# Patient Record
Sex: FEMALE | Race: WHITE | Employment: FULL TIME | URBAN - NONMETROPOLITAN AREA
[De-identification: names, ages, dates, MRNs, and addresses within clinical notes are randomized per-mention and may not be internally consistent; named-entity substitution may affect disease eponyms.]

---

## 2017-10-16 ENCOUNTER — OFFICE VISIT (OUTPATIENT)
Dept: FAMILY MEDICINE CLINIC | Age: 42
End: 2017-10-16
Payer: COMMERCIAL

## 2017-10-16 ENCOUNTER — HOSPITAL ENCOUNTER (OUTPATIENT)
Dept: LAB | Age: 42
Setting detail: SPECIMEN
Discharge: HOME | End: 2017-10-16
Payer: COMMERCIAL

## 2017-10-16 VITALS
SYSTOLIC BLOOD PRESSURE: 102 MMHG | HEART RATE: 63 BPM | BODY MASS INDEX: 26.2 KG/M2 | HEIGHT: 66 IN | OXYGEN SATURATION: 99 % | DIASTOLIC BLOOD PRESSURE: 70 MMHG | WEIGHT: 163 LBS | TEMPERATURE: 99.1 F

## 2017-10-16 DIAGNOSIS — Z13.220 SCREENING CHOLESTEROL LEVEL: ICD-10-CM

## 2017-10-16 DIAGNOSIS — S01.319A: ICD-10-CM

## 2017-10-16 DIAGNOSIS — N64.4 BREAST PAIN: Primary | ICD-10-CM

## 2017-10-16 DIAGNOSIS — Z13.1 SCREENING FOR DIABETES MELLITUS: ICD-10-CM

## 2017-10-16 DIAGNOSIS — F33.0 MILD EPISODE OF RECURRENT MAJOR DEPRESSIVE DISORDER (HCC): ICD-10-CM

## 2017-10-16 DIAGNOSIS — Z13.31 POSITIVE DEPRESSION SCREENING: ICD-10-CM

## 2017-10-16 LAB
ANION GAP SERPL CALCULATED.3IONS-SCNC: 13 MMOL/L (ref 9–17)
BUN BLDV-MCNC: 15 MG/DL (ref 6–20)
BUN/CREAT BLD: 21 (ref 9–20)
CALCIUM SERPL-MCNC: 9.2 MG/DL (ref 8.6–10.4)
CHLORIDE BLD-SCNC: 100 MMOL/L (ref 98–107)
CHOLESTEROL/HDL RATIO: 3.4
CHOLESTEROL: 192 MG/DL
CO2: 26 MMOL/L (ref 20–31)
CREAT SERPL-MCNC: 0.72 MG/DL (ref 0.5–0.9)
ESTIMATED AVERAGE GLUCOSE: 111 MG/DL
GFR AFRICAN AMERICAN: >60 ML/MIN
GFR NON-AFRICAN AMERICAN: >60 ML/MIN
GFR SERPL CREATININE-BSD FRML MDRD: ABNORMAL ML/MIN/{1.73_M2}
GFR SERPL CREATININE-BSD FRML MDRD: ABNORMAL ML/MIN/{1.73_M2}
GLUCOSE BLD-MCNC: 94 MG/DL (ref 70–99)
HBA1C MFR BLD: 5.5 % (ref 4.8–5.9)
HDLC SERPL-MCNC: 56 MG/DL
LDL CHOLESTEROL: 117 MG/DL (ref 0–130)
POTASSIUM SERPL-SCNC: 4.2 MMOL/L (ref 3.7–5.3)
SODIUM BLD-SCNC: 139 MMOL/L (ref 135–144)
TRIGL SERPL-MCNC: 94 MG/DL
VLDLC SERPL CALC-MCNC: NORMAL MG/DL (ref 1–30)

## 2017-10-16 PROCEDURE — G8419 CALC BMI OUT NRM PARAM NOF/U: HCPCS | Performed by: FAMILY MEDICINE

## 2017-10-16 PROCEDURE — G8427 DOCREV CUR MEDS BY ELIG CLIN: HCPCS | Performed by: FAMILY MEDICINE

## 2017-10-16 PROCEDURE — G8484 FLU IMMUNIZE NO ADMIN: HCPCS | Performed by: FAMILY MEDICINE

## 2017-10-16 PROCEDURE — G8431 POS CLIN DEPRES SCRN F/U DOC: HCPCS | Performed by: FAMILY MEDICINE

## 2017-10-16 PROCEDURE — 1036F TOBACCO NON-USER: CPT | Performed by: FAMILY MEDICINE

## 2017-10-16 PROCEDURE — 80061 LIPID PANEL: CPT

## 2017-10-16 PROCEDURE — 36415 COLL VENOUS BLD VENIPUNCTURE: CPT

## 2017-10-16 PROCEDURE — 83036 HEMOGLOBIN GLYCOSYLATED A1C: CPT

## 2017-10-16 PROCEDURE — 80048 BASIC METABOLIC PNL TOTAL CA: CPT

## 2017-10-16 PROCEDURE — 99214 OFFICE O/P EST MOD 30 MIN: CPT | Performed by: FAMILY MEDICINE

## 2017-10-16 ASSESSMENT — PATIENT HEALTH QUESTIONNAIRE - PHQ9
SUM OF ALL RESPONSES TO PHQ QUESTIONS 1-9: 12
SUM OF ALL RESPONSES TO PHQ9 QUESTIONS 1 & 2: 4
9. THOUGHTS THAT YOU WOULD BE BETTER OFF DEAD, OR OF HURTING YOURSELF: 0
10. IF YOU CHECKED OFF ANY PROBLEMS, HOW DIFFICULT HAVE THESE PROBLEMS MADE IT FOR YOU TO DO YOUR WORK, TAKE CARE OF THINGS AT HOME, OR GET ALONG WITH OTHER PEOPLE: 1
5. POOR APPETITE OR OVEREATING: 3
2. FEELING DOWN, DEPRESSED OR HOPELESS: 2
4. FEELING TIRED OR HAVING LITTLE ENERGY: 2
7. TROUBLE CONCENTRATING ON THINGS, SUCH AS READING THE NEWSPAPER OR WATCHING TELEVISION: 0
8. MOVING OR SPEAKING SO SLOWLY THAT OTHER PEOPLE COULD HAVE NOTICED. OR THE OPPOSITE, BEING SO FIGETY OR RESTLESS THAT YOU HAVE BEEN MOVING AROUND A LOT MORE THAN USUAL: 0
6. FEELING BAD ABOUT YOURSELF - OR THAT YOU ARE A FAILURE OR HAVE LET YOURSELF OR YOUR FAMILY DOWN: 1
3. TROUBLE FALLING OR STAYING ASLEEP: 2
1. LITTLE INTEREST OR PLEASURE IN DOING THINGS: 2

## 2017-10-16 ASSESSMENT — ENCOUNTER SYMPTOMS
DIARRHEA: 0
SINUS PRESSURE: 0
CHEST TIGHTNESS: 0
SHORTNESS OF BREATH: 0
BACK PAIN: 1
CONSTIPATION: 1
COUGH: 0
ABDOMINAL PAIN: 0
WHEEZING: 0

## 2017-10-16 NOTE — PROGRESS NOTES
[address] [facility]  Dept: [phone]  Dept Fax: [phone]  Loc: [phone]    Fernando Garcia is a 38 y.o. female who presents today for her medical conditions/complaints as noted below. Fernando Garcia is c/o of   Chief Complaint   Patient presents with    Established New Doctor    Breast Pain     bilateral- right side is more tender x 3 weeks, pt denies any discharge and the pain is intermittant        HPI:     HPI Here today to establish care. She has not seen a pcp in a long time. She has been having issues with breast pain and depression. She has a lot of stomach problems and it was thought that this could be due to too much coffee. She has been trying to drink more water. Breast pain: She has been having problems with pain on the right breast more than the left but the left is affected too. The pain is on the lateral side of the breast. No nipple discharge. No skin changes or redness. With her period this time her breasts became swollen and she was also having hot flashes. LMP was 10/9/17. Her periods are normally very regular. The pain feels more like a soreness. The pain comes and goes. Depression: she feels like she struggles since her divorce. She sees a counselor at [facility]. She is doing some things for fun. She struggles to do fun activities when she is by herself. Her kids are 15 (girl), 6 (girl), 4 (boy). She eats normally. She sleeps pretty well. She used to struggle with panic attacks,but has not had one for a long time. Past Medical History:   Diagnosis Date    Allergic rhinitis     Anemia     struggled with anemia in high school d/t heavy menstration    Syncope     passed out several times in HS          Social History   Substance Use Topics    Smoking status: Never Smoker    Smokeless tobacco: Never Used    Alcohol use No      No current outpatient prescriptions on file.      No current no tenderness. Breasts are symmetrical.   Abdominal: Soft. Bowel sounds are normal. She exhibits no distension. There is no tenderness. There is no guarding. Musculoskeletal: Normal range of motion. She exhibits no edema. Lymphadenopathy:     She has no cervical adenopathy. Neurological: She is alert and oriented to person, place, and time. Skin: Skin is warm and dry. No rash noted. Psychiatric: She has a normal mood and affect. Her behavior is normal. Judgment normal.   Nursing note and vitals reviewed. /70   Pulse 63   Temp 99.1 °F (37.3 °C) (Tympanic)   Ht 5' 6\" (1.676 m)   Wt 163 lb (73.9 kg)   LMP 10/09/2017 (Approximate)   SpO2 99%   Breastfeeding? No   BMI 26.31 kg/m²     Assessment:      1. Breast pain  ALEXIS DIGITAL DIAGNOSTIC BILATERAL    US Breast Bilateral   2. Mild episode of recurrent major depressive disorder ([facility] [address]     3. Tear of earlobe, initial encounter  Ting Verma MD, Dermatology [place]   4. Screening for diabetes mellitus  Basic Metabolic Panel    Hemoglobin A1C   5. Screening cholesterol level  Lipid Panel             Plan:       Breast pain: new; I recommended an ultrasound and mammogram to evaluate for a cyst or malignancy. I told her that if her studies are negative then her pain is likely due to hormonal changes. Depression: stable; she is seeing a counselor at [facility] and she feels like her symptoms are well controlled so I will avoid medication at this time. She was told that she can start medication if she ever feels that she needs it. Ear lobe tear: new; I referred her to derm for further evaluation and treatment. Health maintenance: she is due for diabetic and cholesterol screening so that was ordered. She will schedule her pap for her next visit. Return in about 1 month (around 11/16/2017) for Well woman with pap.     Orders Placed This Encounter   Procedures    ALEXIS DIGITAL DIAGNOSTIC BILATERAL     Standing Status:   Future Standing Expiration Date:   12/16/2018     Breast Bilateral    Basic Metabolic Panel     Standing Status:   Future     Number of Occurrences:   1     Standing Expiration Date:   10/16/2018    Lipid Panel     Standing Status:   Future     Number of Occurrences:   1     Standing Expiration Date:   10/16/2018     Order Specific Question:   Is Patient Fasting?/# of Hours     Answer:   0-Non fasting- per Dr Joanne Cabrales Hemoglobin A1C     Standing Status:   Future     Number of Occurrences:   1     Standing Expiration Date:   10/16/2018   Adelaida Hamilton MD, Dermatology [place]     Referral Priority:   Routine     Referral Type:   Consult for Advice and Opinion     Referral Reason:   Specialty Services Required     Referred to Provider:   Lonny Stover MD     Requested Specialty:   Dermatology     Number of Visits Requested:   1       Patient given educational materials - see patient instructions. Discussed use, benefit, and side effects of prescribed medications. All patient questions answered. Pt voiced understanding. Reviewed health maintenance. Instructed to continue current medications, diet and exercise. Patient agreed with treatment plan. Follow up as directed. Electronically signed by Deanna Akhtar MD on 10/16/2017 at 5:15 PMOn the basis of positive PHQ-9 screening (PHQ-9 Total Score: 12), the following plan was implemented: patient declines further evaluation/treatment for depression. Patient will follow-up in 1 month(s) with PCP.

## 2018-01-26 ENCOUNTER — OFFICE VISIT (OUTPATIENT)
Dept: SURGERY | Age: 43
End: 2018-01-26
Payer: COMMERCIAL

## 2018-01-26 VITALS
WEIGHT: 152.6 LBS | SYSTOLIC BLOOD PRESSURE: 96 MMHG | BODY MASS INDEX: 24.53 KG/M2 | HEART RATE: 56 BPM | DIASTOLIC BLOOD PRESSURE: 68 MMHG | RESPIRATION RATE: 16 BRPM | HEIGHT: 66 IN

## 2018-01-26 DIAGNOSIS — S01.311A TORN EARLOBE, RIGHT, INITIAL ENCOUNTER: Primary | ICD-10-CM

## 2018-01-26 PROCEDURE — 99203 OFFICE O/P NEW LOW 30 MIN: CPT | Performed by: PLASTIC SURGERY

## 2018-01-26 PROCEDURE — G8484 FLU IMMUNIZE NO ADMIN: HCPCS | Performed by: PLASTIC SURGERY

## 2018-01-26 PROCEDURE — 1036F TOBACCO NON-USER: CPT | Performed by: PLASTIC SURGERY

## 2018-01-26 PROCEDURE — G8419 CALC BMI OUT NRM PARAM NOF/U: HCPCS | Performed by: PLASTIC SURGERY

## 2018-01-26 PROCEDURE — G8427 DOCREV CUR MEDS BY ELIG CLIN: HCPCS | Performed by: PLASTIC SURGERY

## 2018-01-26 NOTE — PROGRESS NOTES
Subjective:      Patient ID: Jaqui Hayden is a 38 y.o. female. HPI  Patient presents today for a right earlobe tear. She states this is the first time her earlobe has torn. The tear happened gradually and tore through completely about a year ago. Patient denies personal history of hypertrophic or keloid scarring. Review of Systems    Objective:   Physical Exam   Constitutional: She is oriented to person, place, and time. She appears well-developed and well-nourished. HENT:   Head: Normocephalic. Ears:    Right earlobe tear, healed over. Left earlobe intact. Eyes: EOM are normal. Pupils are equal, round, and reactive to light. Cardiovascular: Normal rate. Pulmonary/Chest: Effort normal.   Neurological: She is alert and oriented to person, place, and time. Skin: Skin is warm and dry. Psychiatric: She has a normal mood and affect. Her behavior is normal.   Nursing note and vitals reviewed. Assessment:      Right torn earlobe. Plan:      I discussed earlobe repair. Then waiting 4 months to repierce. Will schedule. I have discussed with the patient the indication, alternatives, and the possible risks and/or complications which include but are not limited to those delineated on the consent form for the planned procedure and the anesthesia methods. All questions were answered to patient's satisfaction. Consent was reviewed and signed. Will get Jaqui Hayden scheduled.

## 2018-02-20 PROBLEM — Z41.1 ENCOUNTER FOR COSMETIC SURGERY: Status: ACTIVE | Noted: 2018-02-20

## 2019-01-02 ENCOUNTER — OFFICE VISIT (OUTPATIENT)
Dept: PRIMARY CARE CLINIC | Age: 44
End: 2019-01-02
Payer: COMMERCIAL

## 2019-01-02 VITALS
DIASTOLIC BLOOD PRESSURE: 60 MMHG | SYSTOLIC BLOOD PRESSURE: 100 MMHG | OXYGEN SATURATION: 99 % | TEMPERATURE: 99.1 F | HEIGHT: 66 IN | BODY MASS INDEX: 24.75 KG/M2 | WEIGHT: 154 LBS | RESPIRATION RATE: 16 BRPM | HEART RATE: 76 BPM

## 2019-01-02 DIAGNOSIS — R05.9 COUGH: ICD-10-CM

## 2019-01-02 DIAGNOSIS — J01.40 ACUTE NON-RECURRENT PANSINUSITIS: Primary | ICD-10-CM

## 2019-01-02 LAB
INFLUENZA A ANTIBODY: NORMAL
INFLUENZA B ANTIBODY: NORMAL

## 2019-01-02 PROCEDURE — G8484 FLU IMMUNIZE NO ADMIN: HCPCS | Performed by: FAMILY MEDICINE

## 2019-01-02 PROCEDURE — G8427 DOCREV CUR MEDS BY ELIG CLIN: HCPCS | Performed by: FAMILY MEDICINE

## 2019-01-02 PROCEDURE — 99214 OFFICE O/P EST MOD 30 MIN: CPT | Performed by: FAMILY MEDICINE

## 2019-01-02 PROCEDURE — G8419 CALC BMI OUT NRM PARAM NOF/U: HCPCS | Performed by: FAMILY MEDICINE

## 2019-01-02 PROCEDURE — 87804 INFLUENZA ASSAY W/OPTIC: CPT | Performed by: FAMILY MEDICINE

## 2019-01-02 PROCEDURE — 1036F TOBACCO NON-USER: CPT | Performed by: FAMILY MEDICINE

## 2019-01-02 RX ORDER — AZITHROMYCIN 250 MG/1
TABLET, FILM COATED ORAL
Qty: 1 PACKET | Refills: 0 | Status: SHIPPED | OUTPATIENT
Start: 2019-01-02 | End: 2019-01-12

## 2019-01-02 ASSESSMENT — ENCOUNTER SYMPTOMS
SHORTNESS OF BREATH: 0
VOMITING: 0
SORE THROAT: 0
NAUSEA: 0
SINUS PAIN: 1
DIARRHEA: 0
ABDOMINAL PAIN: 0
COUGH: 1
SINUS PRESSURE: 1
EYE DISCHARGE: 0
RHINORRHEA: 1
TROUBLE SWALLOWING: 0
EYE REDNESS: 0
CONSTIPATION: 0
WHEEZING: 0

## 2019-01-02 ASSESSMENT — PATIENT HEALTH QUESTIONNAIRE - PHQ9
SUM OF ALL RESPONSES TO PHQ QUESTIONS 1-9: 0
1. LITTLE INTEREST OR PLEASURE IN DOING THINGS: 0
SUM OF ALL RESPONSES TO PHQ9 QUESTIONS 1 & 2: 0
2. FEELING DOWN, DEPRESSED OR HOPELESS: 0
SUM OF ALL RESPONSES TO PHQ QUESTIONS 1-9: 0

## 2019-07-23 ENCOUNTER — OFFICE VISIT (OUTPATIENT)
Dept: PRIMARY CARE CLINIC | Age: 44
End: 2019-07-23
Payer: COMMERCIAL

## 2019-07-23 VITALS
BODY MASS INDEX: 26.49 KG/M2 | OXYGEN SATURATION: 99 % | HEIGHT: 65 IN | TEMPERATURE: 99.4 F | WEIGHT: 159 LBS | DIASTOLIC BLOOD PRESSURE: 68 MMHG | SYSTOLIC BLOOD PRESSURE: 112 MMHG | HEART RATE: 60 BPM | RESPIRATION RATE: 16 BRPM

## 2019-07-23 DIAGNOSIS — R21 RASH: Primary | ICD-10-CM

## 2019-07-23 PROCEDURE — G8427 DOCREV CUR MEDS BY ELIG CLIN: HCPCS | Performed by: FAMILY MEDICINE

## 2019-07-23 PROCEDURE — 99213 OFFICE O/P EST LOW 20 MIN: CPT | Performed by: FAMILY MEDICINE

## 2019-07-23 PROCEDURE — 1036F TOBACCO NON-USER: CPT | Performed by: FAMILY MEDICINE

## 2019-07-23 PROCEDURE — G8419 CALC BMI OUT NRM PARAM NOF/U: HCPCS | Performed by: FAMILY MEDICINE

## 2019-07-23 RX ORDER — METHYLPREDNISOLONE 4 MG/1
TABLET ORAL
Qty: 1 KIT | Refills: 0 | Status: SHIPPED | OUTPATIENT
Start: 2019-07-23 | End: 2019-07-29

## 2019-07-23 NOTE — PATIENT INSTRUCTIONS
Pre-Contact Skin Solution, come in lotions, sprays, or towelettes. You put the product on your skin right before you go outdoors. · If you did not use a preventive product and you have had contact with plant oil, clean it off your skin as soon as possible. Use a product such as [employer]ec[employer] Original Outdoor Skin Cleanser. These products can also be used to clean plant oil from clothing or tools. When should you call for help? Call your doctor now or seek immediate medical care if:    · Your rash gets worse, and you start to feel bad and have a fever, a stiff neck, nausea, and vomiting.     · You have signs of infection, such as:  ? Increased pain, swelling, warmth, or redness. ? Red streaks leading from the rash. ? Pus draining from the rash. ? A fever.    Watch closely for changes in your health, and be sure to contact your doctor if:    · You have new blisters or bruises, or the rash spreads and looks like a sunburn.     · The rash gets worse, or it comes back after nearly disappearing.     · You think a medicine you are using is making your rash worse.     · Your rash does not clear up after 1 to 2 weeks of home treatment.     · You have joint aches or body aches with your rash. Where can you learn more? Go to https://Borderfree.Intrakr. org and sign in to your [employer] account. Enter W842 in the [facility] box to learn more about \"Poison [place], [place], and [place]: Care Instructions. \"     If you do not have an account, please click on the \"Sign Up Now\" link. Current as of: April 17, 2018  Content Version: 12.0  © 1188-3745 Healthwise, [employer]. Care instructions adapted under license by [facility] ([facility]). If you have questions about a medical condition or this instruction, always ask your healthcare professional. [address] any warranty or liability for your use of this information.

## 2020-06-01 ENCOUNTER — TELEPHONE (OUTPATIENT)
Dept: FAMILY MEDICINE CLINIC | Age: 45
End: 2020-06-01

## 2020-06-02 ENCOUNTER — OFFICE VISIT (OUTPATIENT)
Dept: FAMILY MEDICINE CLINIC | Age: 45
End: 2020-06-02
Payer: COMMERCIAL

## 2020-06-02 VITALS
HEIGHT: 65 IN | HEART RATE: 62 BPM | OXYGEN SATURATION: 99 % | BODY MASS INDEX: 26.09 KG/M2 | RESPIRATION RATE: 16 BRPM | TEMPERATURE: 97.4 F | WEIGHT: 156.6 LBS | DIASTOLIC BLOOD PRESSURE: 70 MMHG | SYSTOLIC BLOOD PRESSURE: 118 MMHG

## 2020-06-02 PROCEDURE — G8427 DOCREV CUR MEDS BY ELIG CLIN: HCPCS | Performed by: NURSE PRACTITIONER

## 2020-06-02 PROCEDURE — 99213 OFFICE O/P EST LOW 20 MIN: CPT | Performed by: NURSE PRACTITIONER

## 2020-06-02 PROCEDURE — 1036F TOBACCO NON-USER: CPT | Performed by: NURSE PRACTITIONER

## 2020-06-02 PROCEDURE — G8419 CALC BMI OUT NRM PARAM NOF/U: HCPCS | Performed by: NURSE PRACTITIONER

## 2020-06-02 RX ORDER — PREDNISONE 20 MG/1
20 TABLET ORAL 2 TIMES DAILY
Qty: 10 TABLET | Refills: 0 | Status: SHIPPED | OUTPATIENT
Start: 2020-06-02 | End: 2020-06-07

## 2020-06-02 SDOH — ECONOMIC STABILITY: FOOD INSECURITY: WITHIN THE PAST 12 MONTHS, YOU WORRIED THAT YOUR FOOD WOULD RUN OUT BEFORE YOU GOT MONEY TO BUY MORE.: NEVER TRUE

## 2020-06-02 SDOH — ECONOMIC STABILITY: TRANSPORTATION INSECURITY
IN THE PAST 12 MONTHS, HAS THE LACK OF TRANSPORTATION KEPT YOU FROM MEDICAL APPOINTMENTS OR FROM GETTING MEDICATIONS?: NO

## 2020-06-02 SDOH — ECONOMIC STABILITY: FOOD INSECURITY: WITHIN THE PAST 12 MONTHS, THE FOOD YOU BOUGHT JUST DIDN'T LAST AND YOU DIDN'T HAVE MONEY TO GET MORE.: NEVER TRUE

## 2020-06-02 SDOH — ECONOMIC STABILITY: TRANSPORTATION INSECURITY
IN THE PAST 12 MONTHS, HAS LACK OF TRANSPORTATION KEPT YOU FROM MEETINGS, WORK, OR FROM GETTING THINGS NEEDED FOR DAILY LIVING?: NO

## 2020-06-02 SDOH — ECONOMIC STABILITY: INCOME INSECURITY: HOW HARD IS IT FOR YOU TO PAY FOR THE VERY BASICS LIKE FOOD, HOUSING, MEDICAL CARE, AND HEATING?: NOT HARD AT ALL

## 2020-06-02 ASSESSMENT — ENCOUNTER SYMPTOMS
RHINORRHEA: 0
RESPIRATORY NEGATIVE: 1
COUGH: 0
GASTROINTESTINAL NEGATIVE: 1

## 2020-06-02 ASSESSMENT — PATIENT HEALTH QUESTIONNAIRE - PHQ9
SUM OF ALL RESPONSES TO PHQ9 QUESTIONS 1 & 2: 0
2. FEELING DOWN, DEPRESSED OR HOPELESS: 0
SUM OF ALL RESPONSES TO PHQ QUESTIONS 1-9: 0
1. LITTLE INTEREST OR PLEASURE IN DOING THINGS: 0
SUM OF ALL RESPONSES TO PHQ QUESTIONS 1-9: 0

## 2021-04-14 ENCOUNTER — IMMUNIZATION (OUTPATIENT)
Dept: LAB | Age: 46
End: 2021-04-14
Payer: COMMERCIAL

## 2021-04-14 PROCEDURE — 91301 COVID-19, MODERNA VACCINE 100MCG/0.5ML DOSE: CPT | Performed by: INTERNAL MEDICINE

## 2021-04-14 PROCEDURE — 0011A COVID-19, MODERNA VACCINE 100MCG/0.5ML DOSE: CPT | Performed by: INTERNAL MEDICINE

## 2021-05-12 ENCOUNTER — IMMUNIZATION (OUTPATIENT)
Dept: LAB | Age: 46
End: 2021-05-12
Payer: COMMERCIAL

## 2021-05-12 PROCEDURE — 91301 COVID-19, MODERNA VACCINE 100MCG/0.5ML DOSE: CPT | Performed by: INTERNAL MEDICINE

## 2021-05-12 PROCEDURE — 0012A COVID-19, MODERNA VACCINE 100MCG/0.5ML DOSE: CPT | Performed by: INTERNAL MEDICINE

## 2021-09-16 ENCOUNTER — HOSPITAL ENCOUNTER (OUTPATIENT)
Dept: LAB | Age: 46
Discharge: HOME | End: 2021-09-16
Payer: COMMERCIAL

## 2021-09-16 ENCOUNTER — OFFICE VISIT (OUTPATIENT)
Dept: PODIATRY | Age: 46
End: 2021-09-16
Payer: COMMERCIAL

## 2021-09-16 VITALS
HEIGHT: 65 IN | BODY MASS INDEX: 25.83 KG/M2 | DIASTOLIC BLOOD PRESSURE: 60 MMHG | SYSTOLIC BLOOD PRESSURE: 110 MMHG | HEART RATE: 64 BPM | WEIGHT: 155 LBS

## 2021-09-16 DIAGNOSIS — L60.0 OC (ONYCHOCRYPTOSIS): ICD-10-CM

## 2021-09-16 DIAGNOSIS — B35.1 DERMATOPHYTOSIS OF NAIL: ICD-10-CM

## 2021-09-16 DIAGNOSIS — B35.1 DERMATOPHYTOSIS OF NAIL: Primary | ICD-10-CM

## 2021-09-16 LAB
ALBUMIN SERPL-MCNC: 4.2 G/DL (ref 3.5–5.2)
ALBUMIN/GLOBULIN RATIO: 1.1 (ref 1–2.5)
ALP BLD-CCNC: 63 U/L (ref 35–104)
ALT SERPL-CCNC: 9 U/L (ref 5–33)
AST SERPL-CCNC: 19 U/L
BILIRUB SERPL-MCNC: 0.69 MG/DL (ref 0.3–1.2)
BILIRUBIN DIRECT: 0.17 MG/DL
BILIRUBIN, INDIRECT: 0.52 MG/DL (ref 0–1)
GLOBULIN: 3.7 G/DL (ref 1.5–3.8)
TOTAL PROTEIN: 7.9 G/DL (ref 6.4–8.3)

## 2021-09-16 PROCEDURE — 80076 HEPATIC FUNCTION PANEL: CPT

## 2021-09-16 PROCEDURE — 99203 OFFICE O/P NEW LOW 30 MIN: CPT | Performed by: PODIATRIST

## 2021-09-16 PROCEDURE — 36415 COLL VENOUS BLD VENIPUNCTURE: CPT

## 2021-09-16 RX ORDER — TERBINAFINE HYDROCHLORIDE 250 MG/1
250 TABLET ORAL DAILY
Qty: 90 TABLET | Refills: 0 | Status: SHIPPED | OUTPATIENT
Start: 2021-09-16 | End: 2021-12-15

## 2021-09-16 NOTE — PROGRESS NOTES
Subjective:  Anisa Lau is a 42 y.o. female who presents to the office today complaining of thickness to nails. Symptoms began month(s) ago. Patient relates pain is Present. Pain is rated 1 out of 10 and is described as intermittent. Treatments prior to today's visit include: none. .  Currently denies F/C/N/V. Allergies   Allergen Reactions    Pcn [Penicillins] Other (See Comments)     Unsure of reaction; happened as a child. Past Medical History:   Diagnosis Date    Allergic rhinitis     Anemia     struggled with anemia in high school d/t heavy menstration    Mild episode of recurrent major depressive disorder ([facility] [address] 10/16/2017    Syncope     passed out several times in HS    Uses contact lenses     Wears glasses     occasional       Prior to Admission medications    Not on File       Past Surgical History:   Procedure Laterality Date    KNEE SURGERY Right 1992    Took an \"extra bone\" out of the right knee.  TUBAL LIGATION      TUBAL LIGATION  2013    Dr. Eldridge Led       Family History   Problem Relation Age of Onset    Cancer Mother         pancreatic cancer    Diabetes Mother        Social History     Tobacco Use    Smoking status: Never Smoker    Smokeless tobacco: Never Used    Tobacco comment: Tried one or two cigarettes as a kid. Never a habit. Substance Use Topics    Alcohol use: No     Comment: Haven't had alcohol in a year (since 2016)       ROS: All 14 ROS systems reviewed and pertinent positives noted above, all others negative. Lower Extremity Physical Examination:     Vitals:   Vitals:    09/16/21 0854   BP: 110/60   Pulse: 64     General: AAO x 3 in NAD.    Vascular: DP and PT pulses palpable 2/4, bilateral.  CFT <3 seconds, bilateral.  Hair growth present to the level of the digits, bilateral.  Edema absent, bilateral.  Varicosities absent, bilateral. Erythema absent, bilateral. Distal Rubor absent bilateral.  Temperature within normal limits bilateral. Hyperpigmentation absent bilateral. No atrophic skin. Neurological: Sensation intact to light touch to level of digits, bilateral.  Protective sensation intact 10/10 sites via 5.07/10g [place]-Hasmukh Monofilament, bilateral.  negative Tinel's, bilateral.  negative Valleix sign, bilateral.  Vibratory intact bilateral.  Reflexes Decreased bilateral.  Paresthesias negative. Dysthesias negative. Sharp/dull intact bilateral.    Musculoskeletal: Muscle strength 5/5, bilateral.  Pain absent upon palpation bilateral. Normal medial longitudinal arch, bilateral.  Ankle ROM within normal limits,bilateral.  1st MPJ ROM within normal limits, bilateral.  Dorsally contracted digits absent. No other foot deformities. Integument: Warm, dry, supple, bilateral.  Onychocryptosis right medial hallux but no paronychia. Open lesion absent, bilateral.  Interdigital maceration absent to web spaces bilateral.   Nails left 2 and right 5 thickened, dystrophic and crumbly, discolored with subungual debris. Fissures absent, bilateral. Hyperkeratotic tissue is absent. Asessment: Patient is a 42 y.o. female with:    Diagnosis Orders   1. Dermatophytosis of nail     2. OC (onychocryptosis)         Plan: Patient examined and evaluated. Current condition and treatment options discussed in detail. Due to level of pain/deformity, it is in my medical opinion that patient will benefit from and is medically necessary for pre shanice orthotics at this time. Pt was given the option of pre fabricated insoles. Pt was advised on appropriate use and how they can help their condition. Pt should use these in shoe gear 100% of the time WB. Orders Placed This Encounter   Procedures    Hepatic Function Panel     Standing Status:   Future     Standing Expiration Date:   10/16/2021     Pt given tx options for anti fungal therapy. Pt educated on Rx po lamisil, Rx topical Penlac, OTC home remedies or other OTC topical meds.    If labs of, Send in Rx lamsil 250 mg #90, 1 po qd,  Patient is low level medical decision making overall. Patient is stable chronic condition. 1 new test.  Overall low risk morbidity the current treatment course. Contact office with any questions/problems/concerns. RTC in 3week(s).

## 2021-12-30 ENCOUNTER — TELEPHONE (OUTPATIENT)
Dept: FAMILY MEDICINE CLINIC | Age: 46
End: 2021-12-30

## 2022-01-03 ENCOUNTER — TELEMEDICINE (OUTPATIENT)
Dept: FAMILY MEDICINE CLINIC | Age: 47
End: 2022-01-03
Payer: COMMERCIAL

## 2022-01-03 DIAGNOSIS — J06.9 VIRAL URI: Primary | ICD-10-CM

## 2022-01-03 PROCEDURE — 99443 PR PHYS/QHP TELEPHONE EVALUATION 21-30 MIN: CPT | Performed by: FAMILY MEDICINE

## 2022-01-03 ASSESSMENT — ENCOUNTER SYMPTOMS
WHEEZING: 1
SORE THROAT: 1
SHORTNESS OF BREATH: 0
RHINORRHEA: 1
COUGH: 1

## 2022-01-03 NOTE — LETTER
[facility] department of [place] [address]  Phone: [phone]  Fax: [phone]    Lexie Kennedy MD        January 3, 2022     Patient: Neil Joseph   YOB: 1975   Date of Visit: 1/3/2022       To Whom It May Concern: It is my medical opinion that Neil Joseph may return to work on 1/7/22. She missed work on 1/3-6 due to covid. If you have any questions or concerns, please don't hesitate to call.     Sincerely,        Lexie Kennedy MD

## 2022-01-03 NOTE — PROGRESS NOTES
1/3/2022    TELEHEALTH EVALUATION -- Audio/Visual (During [id number] public health emergency)    HPI: Seen today via video visit while she was at home and I was at home    Chief Complaint   Patient presents with    Concern For COVID-19     daughter was positive; pt is congested and coughing         Sinai Roberts (:  1975) has requested an audio/video evaluation for the following concern(s):    Cough  This is a new problem. The current episode started in the past 7 days (1 week ()). The problem has been gradually improving. The problem occurs hourly. The cough is non-productive. Associated symptoms include chills, headaches, myalgias, nasal congestion, postnasal drip, a rash (improving), rhinorrhea, a sore throat, sweats and wheezing (improving). Pertinent negatives include no ear congestion, ear pain, fever or shortness of breath. Associated symptoms comments: Cough, diaphoresis, no nausea, vomiting, or diarrhea. The symptoms are aggravated by lying down and exercise. Treatments tried: ibuprofen, dayquil. The treatment provided mild relief. There is no history of asthma. Daughter tested positive for covid 5 days ago. Daughter felt better starting yesterday. Review of Systems   Constitutional: Positive for chills. Negative for fever. HENT: Positive for postnasal drip, rhinorrhea and sore throat. Negative for ear pain. Respiratory: Positive for cough and wheezing (improving). Negative for shortness of breath. Musculoskeletal: Positive for myalgias. Skin: Positive for rash (improving). Neurological: Positive for headaches. Prior to Visit Medications    Not on File       Social History     Tobacco Use    Smoking status: Never Smoker    Smokeless tobacco: Never Used    Tobacco comment: Tried one or two cigarettes as a kid. Never a habit.    Substance Use Topics    Alcohol use: No     Comment: Haven't had alcohol in a year (since 2016)    Drug use: No        Allergies   Allergen Reactions    Pcn [Penicillins] Other (See Comments)     Unsure of reaction; happened as a child. ,   Past Medical History:   Diagnosis Date    Allergic rhinitis     Anemia     struggled with anemia in high school d/t heavy menstration    Mild episode of recurrent major depressive disorder ([facility] [address] 10/16/2017    Syncope     passed out several times in HS    Uses contact lenses     Wears glasses     occasional       PHYSICAL EXAMINATION:  [ INSTRUCTIONS:  \"[x]\" Indicates a positive item  \"[]\" Indicates a negative item  -- DELETE ALL ITEMS NOT EXAMINED]  Vital Signs: (As obtained by patient/caregiver or practitioner observation)    Patient-Reported Vitals 1/3/2022   Patient-Reported Weight 150 lbs   Patient-Reported Height 5'5   Patient-Reported Temperature 98          Constitutional: [x] Appears well-developed and well-nourished [x] No apparent distress      [] Abnormal-   Mental status  [x] Alert and awake  [x] Oriented to person/place/time [x]Able to follow commands      Eyes:  EOM    []  Normal  [] Abnormal-  Sclera  []  Normal  [] Abnormal -         Discharge []  None visible  [] Abnormal -    HENT:   [] Normocephalic, atraumatic.   [] Abnormal   [] Mouth/Throat: Mucous membranes are moist.     External Ears [] Normal  [] Abnormal-     Neck: [] No visualized mass     Pulmonary/Chest: [x] Respiratory effort normal.  [x] No visualized signs of difficulty breathing or respiratory distress        [] Abnormal-      Musculoskeletal:   [] Normal gait with no signs of ataxia         [] Normal range of motion of neck        [] Abnormal-       Neurological:        [] No Facial Asymmetry (Cranial nerve 7 motor function) (limited exam to video visit)          [] No gaze palsy        [] Abnormal-         Skin:        [] No significant exanthematous lesions or discoloration noted on facial skin         [] Abnormal-            Psychiatric:       [x] Normal Affect [x] No Hallucinations        [] Abnormal-     Other pertinent observable physical exam findings-     ASSESSMENT/PLAN:  1. Viral URI  Suspected covid: new; due to her symptoms I cannot rule out covid so I ordered a covid test and I told her to self quarantine until the test results come back I also advised her to use tylenol and nsaids for pain and fever. I recommended that she use mucinex to help with congestion and cough. I also recommended Flonase and an antihistamine for sinus symptoms. she was instructed to return if there is no improvement or symptoms worsen. - COVID-19; Future      Return if symptoms worsen or fail to improve. [facility], was evaluated through a synchronous (real-time) audio-video encounter. The patient (or guardian if applicable) is aware that this is a billable service. Verbal consent to proceed has been obtained within the past 12 months. The visit was conducted pursuant to the emergency declaration under the [address] [place] [address], [address] [place] [address] authority and the [employer] General Act. Patient identification was verified, and a caregiver was present when appropriate. The patient was located in a state where the provider was credentialed to provide care. Total time spent on this encounter: 25 minutes    --Ashvin Redmond MD on 1/3/2022 at 4:16 PM    An electronic signature was used to authenticate this note.

## 2022-01-04 ENCOUNTER — HOSPITAL ENCOUNTER (OUTPATIENT)
Age: 47
Setting detail: SPECIMEN
Discharge: HOME | End: 2022-01-04
Payer: COMMERCIAL

## 2022-01-04 DIAGNOSIS — J06.9 VIRAL URI: ICD-10-CM

## 2022-01-04 PROCEDURE — U0003 INFECTIOUS AGENT DETECTION BY NUCLEIC ACID (DNA OR RNA); SEVERE ACUTE RESPIRATORY SYNDROME CORONAVIRUS 2 (SARS-COV-2) (CORONAVIRUS DISEASE [COVID-19]), AMPLIFIED PROBE TECHNIQUE, MAKING USE OF HIGH THROUGHPUT TECHNOLOGIES AS DESCRIBED BY CMS-2020-01-R: HCPCS

## 2022-01-04 PROCEDURE — U0005 INFEC AGEN DETEC AMPLI PROBE: HCPCS

## 2022-01-05 LAB
SARS-COV-2: NORMAL
SARS-COV-2: NOT DETECTED
SOURCE: NORMAL

## 2022-01-27 ENCOUNTER — VIRTUAL VISIT (OUTPATIENT)
Dept: FAMILY MEDICINE CLINIC | Age: 47
End: 2022-01-27
Payer: COMMERCIAL

## 2022-01-27 ENCOUNTER — HOSPITAL ENCOUNTER (OUTPATIENT)
Age: 47
Setting detail: SPECIMEN
Discharge: HOME | End: 2022-01-27
Payer: COMMERCIAL

## 2022-01-27 DIAGNOSIS — J06.9 VIRAL URI: Primary | ICD-10-CM

## 2022-01-27 DIAGNOSIS — J06.9 VIRAL URI: ICD-10-CM

## 2022-01-27 PROCEDURE — U0005 INFEC AGEN DETEC AMPLI PROBE: HCPCS

## 2022-01-27 PROCEDURE — 99213 OFFICE O/P EST LOW 20 MIN: CPT | Performed by: FAMILY MEDICINE

## 2022-01-27 PROCEDURE — U0003 INFECTIOUS AGENT DETECTION BY NUCLEIC ACID (DNA OR RNA); SEVERE ACUTE RESPIRATORY SYNDROME CORONAVIRUS 2 (SARS-COV-2) (CORONAVIRUS DISEASE [COVID-19]), AMPLIFIED PROBE TECHNIQUE, MAKING USE OF HIGH THROUGHPUT TECHNOLOGIES AS DESCRIBED BY CMS-2020-01-R: HCPCS

## 2022-01-27 SDOH — ECONOMIC STABILITY: FOOD INSECURITY: WITHIN THE PAST 12 MONTHS, THE FOOD YOU BOUGHT JUST DIDN'T LAST AND YOU DIDN'T HAVE MONEY TO GET MORE.: PATIENT DECLINED

## 2022-01-27 SDOH — ECONOMIC STABILITY: FOOD INSECURITY: WITHIN THE PAST 12 MONTHS, YOU WORRIED THAT YOUR FOOD WOULD RUN OUT BEFORE YOU GOT MONEY TO BUY MORE.: PATIENT DECLINED

## 2022-01-27 ASSESSMENT — PATIENT HEALTH QUESTIONNAIRE - PHQ9
SUM OF ALL RESPONSES TO PHQ QUESTIONS 1-9: 0
2. FEELING DOWN, DEPRESSED OR HOPELESS: 0
5. POOR APPETITE OR OVEREATING: 0
SUM OF ALL RESPONSES TO PHQ QUESTIONS 1-9: 0
4. FEELING TIRED OR HAVING LITTLE ENERGY: 0
6. FEELING BAD ABOUT YOURSELF - OR THAT YOU ARE A FAILURE OR HAVE LET YOURSELF OR YOUR FAMILY DOWN: 0
3. TROUBLE FALLING OR STAYING ASLEEP: 0
SUM OF ALL RESPONSES TO PHQ QUESTIONS 1-9: 0
SUM OF ALL RESPONSES TO PHQ QUESTIONS 1-9: 0
9. THOUGHTS THAT YOU WOULD BE BETTER OFF DEAD, OR OF HURTING YOURSELF: 0
8. MOVING OR SPEAKING SO SLOWLY THAT OTHER PEOPLE COULD HAVE NOTICED. OR THE OPPOSITE, BEING SO FIGETY OR RESTLESS THAT YOU HAVE BEEN MOVING AROUND A LOT MORE THAN USUAL: 0
7. TROUBLE CONCENTRATING ON THINGS, SUCH AS READING THE NEWSPAPER OR WATCHING TELEVISION: 0

## 2022-01-27 ASSESSMENT — ENCOUNTER SYMPTOMS
DIARRHEA: 1
ABDOMINAL PAIN: 0
COUGH: 1
NAUSEA: 0
WHEEZING: 0
VOMITING: 0
SORE THROAT: 1

## 2022-01-27 ASSESSMENT — SOCIAL DETERMINANTS OF HEALTH (SDOH): HOW HARD IS IT FOR YOU TO PAY FOR THE VERY BASICS LIKE FOOD, HOUSING, MEDICAL CARE, AND HEATING?: PATIENT DECLINED

## 2022-01-27 NOTE — PROGRESS NOTES
2022    TELEHEALTH EVALUATION -- Audio/Visual (During VDZLC-26 public health emergency)    HPI: Seen today via video visit while she was at home and I was at work    Chief Complaint   Patient presents with    Concern For COVID-19     fever, COLLINS, aches       Sinai Roberts (:  1975) has requested an audio/video evaluation for the following concern(s):    Fever   This is a new problem. The current episode started in the past 7 days (22). The problem occurs constantly. The maximum temperature noted was 102 to 102.9 F. The temperature was taken using a tympanic thermometer. Associated symptoms include congestion, coughing (dry), diarrhea, headaches, muscle aches, sleepiness and a sore throat (resolved). Pertinent negatives include no abdominal pain, ear pain, nausea, rash, vomiting or wheezing (improving). Associated symptoms comments: diaphoresis . She has tried acetaminophen (mucinex) for the symptoms. The treatment provided mild (mucinex) relief. Risk factors: sick contacts      Her grandma  of covid last week and she was exposed to her. Review of Systems   Constitutional: Positive for fever. HENT: Positive for congestion and sore throat (resolved). Negative for ear pain. Respiratory: Positive for cough (dry). Negative for wheezing (improving). Gastrointestinal: Positive for diarrhea. Negative for abdominal pain, nausea and vomiting. Skin: Negative for rash. Neurological: Positive for headaches. Prior to Visit Medications    Not on File       Social History     Tobacco Use    Smoking status: Never Smoker    Smokeless tobacco: Never Used    Tobacco comment: Tried one or two cigarettes as a kid. Never a habit. Substance Use Topics    Alcohol use: No     Comment: Haven't had alcohol in a year (since 2016)    Drug use: No        Allergies   Allergen Reactions    Pcn [Penicillins] Other (See Comments)     Unsure of reaction; happened as a child.    ,   Past Medical History:   Diagnosis Date    Allergic rhinitis     Anemia     struggled with anemia in high school d/t heavy menstration    Mild episode of recurrent major depressive disorder ([facility] [address] 10/16/2017    Syncope     passed out several times in HS    Uses contact lenses     Wears glasses     occasional       PHYSICAL EXAMINATION:  [ INSTRUCTIONS:  \"[x]\" Indicates a positive item  \"[]\" Indicates a negative item  -- DELETE ALL ITEMS NOT EXAMINED]  Vital Signs: (As obtained by patient/caregiver or practitioner observation)    Patient-Reported Vitals 1/27/2022   Patient-Reported Weight 155   Patient-Reported Height 55   Patient-Reported Temperature 98        Constitutional: [x] Appears well-developed and well-nourished [x] No apparent distress      [] Abnormal-   Mental status  [x] Alert and awake  [x] Oriented to person/place/time [x]Able to follow commands      Eyes:  EOM    []  Normal  [] Abnormal-  Sclera  []  Normal  [] Abnormal -         Discharge []  None visible  [] Abnormal -    HENT:   [x] Normocephalic, atraumatic. [] Abnormal   [x] Mouth/Throat: Mucous membranes are moist.     External Ears [x] Normal  [] Abnormal-     Neck: [x] No visualized mass     Pulmonary/Chest: [x] Respiratory effort normal.  [x] No visualized signs of difficulty breathing or respiratory distress        [] Abnormal-      Musculoskeletal:   [x] Normal gait with no signs of ataxia         [x] Normal range of motion of neck        [] Abnormal-       Neurological:        [x] No Facial Asymmetry (Cranial nerve 7 motor function) (limited exam to video visit)          [] No gaze palsy        [] Abnormal-         Skin:        [x] No significant exanthematous lesions or discoloration noted on facial skin         [] Abnormal-            Psychiatric:       [x] Normal Affect [x] No Hallucinations        [] Abnormal-     Other pertinent observable physical exam findings-     ASSESSMENT/PLAN:  1.  Viral URI  Suspected covid: new; due to her symptoms I cannot rule out covid so I ordered a covid test and I told her to self quarantine until the test results come back I also advised her to use tylenol and nsaids for pain and fever. I recommended that she use mucinex to help with congestion and cough. I also recommended Flonase and an antihistamine for sinus symptoms. she was instructed to return if there is no improvement or symptoms worsen. - COVID-19; Future      Return if symptoms worsen or fail to improve. [facility], was evaluated through a synchronous (real-time) audio-video encounter. The patient (or guardian if applicable) is aware that this is a billable service, which includes applicable co-pays. This Virtual Visit was conducted with patient's (and/or legal guardian's) consent. The visit was conducted pursuant to the emergency declaration under the [address] [place] [address], [address] [place] [address] [facility] authority and the [employer] General Act. Patient identification was verified, and a caregiver was present when appropriate. The patient was located at home in a state where the provider was licensed to provide care. Total time spent on this encounter: Not billed by time    --Phill Hercules MD on 1/27/2022 at 10:14 AM    An electronic signature was used to authenticate this note.

## 2022-01-27 NOTE — LETTER
[facility] A department of [place] [address]  Phone: [phone]  Fax: [phone]    Mick Bermudez MD        January 27, 2022     Patient: Nadine Durbin   YOB: 1975   Date of Visit: 1/27/2022       To Whom It May Concern: It is my medical opinion that Nadine Durbin may return to work on 1/30/22. She missed work starting 1/24 due to illness. If you have any questions or concerns, please don't hesitate to call.     Sincerely,        Mick Bermudez MD

## 2022-01-28 LAB
SARS-COV-2: ABNORMAL
SARS-COV-2: DETECTED
SOURCE: ABNORMAL

## 2022-06-16 ENCOUNTER — OFFICE VISIT (OUTPATIENT)
Dept: PODIATRY | Age: 47
End: 2022-06-16
Payer: COMMERCIAL

## 2022-06-16 VITALS
RESPIRATION RATE: 20 BRPM | SYSTOLIC BLOOD PRESSURE: 118 MMHG | DIASTOLIC BLOOD PRESSURE: 68 MMHG | BODY MASS INDEX: 27.42 KG/M2 | HEART RATE: 76 BPM | WEIGHT: 164.8 LBS

## 2022-06-16 DIAGNOSIS — M79.675 PAIN OF TOE OF LEFT FOOT: ICD-10-CM

## 2022-06-16 DIAGNOSIS — B35.1 DERMATOPHYTOSIS OF NAIL: Primary | ICD-10-CM

## 2022-06-16 PROCEDURE — 99213 OFFICE O/P EST LOW 20 MIN: CPT | Performed by: PODIATRIST

## 2022-06-16 NOTE — PROGRESS NOTES
Subjective:  Ramiro Calvo is a 41 y.o. female who presents to the office today complaining of  Fungal toenail. Symptoms slightly improved. .  Patient relates pain is Present. Pain is rated 1 out of 10 and is described as intermittent. Patient nursing other treatment options. Currently denies F/C/N/V. Allergies   Allergen Reactions    Pcn [Penicillins] Other (See Comments)     Unsure of reaction; happened as a child. Past Medical History:   Diagnosis Date    Allergic rhinitis     Anemia     struggled with anemia in high school d/t heavy menstration    Mild episode of recurrent major depressive disorder ([facility] [address] 10/16/2017    Syncope     passed out several times in HS    Uses contact lenses     Wears glasses     occasional       Prior to Admission medications    Medication Sig Start Date End Date Taking? Authorizing Provider   ciclopirox (PENLAC) 8 % solution Apply topically nightly. 6/16/22  Yes Josue Sotelo, DPM       Past Surgical History:   Procedure Laterality Date    KNEE SURGERY Right 1992    Took an \"extra bone\" out of the right knee.  TUBAL LIGATION      TUBAL LIGATION  2013    Dr. Jeremie Richardson       Family History   Problem Relation Age of Onset    Cancer Mother         pancreatic cancer    Diabetes Mother        Social History     Tobacco Use    Smoking status: Never Smoker    Smokeless tobacco: Never Used    Tobacco comment: Tried one or two cigarettes as a kid. Never a habit. Substance Use Topics    Alcohol use: No     Comment: Haven't had alcohol in a year (since 2016)       ROS: All 14 ROS systems reviewed and pertinent positives noted above, all others negative. Lower Extremity Physical Examination:     Vitals:   Vitals:    06/16/22 1523   BP: 118/68   Pulse: 76   Resp: 20     General: AAO x 3 in NAD.    Vascular: DP and PT pulses palpable 2/4, bilateral.  CFT <3 seconds, bilateral.  Hair growth present to the level of the digits, bilateral.  Edema absent, bilateral. Varicosities absent, bilateral. Erythema absent, bilateral. Distal Rubor absent bilateral.  Temperature within normal limits bilateral. Hyperpigmentation absent bilateral. No atrophic skin. Neurological: Sensation intact to light touch to level of digits, bilateral.  Protective sensation intact 10/10 sites via 5.07/10g [facility] Monofilament, bilateral.  negative Tinel's, bilateral.  negative Valleix sign, bilateral.  Vibratory intact bilateral.  Reflexes Decreased bilateral.  Paresthesias negative. Dysthesias negative. Sharp/dull intact bilateral.    Musculoskeletal: Muscle strength 5/5, bilateral.  Pain present upon palpation L 2. Normal medial longitudinal arch, bilateral.  Ankle ROM within normal limits,bilateral.  1st MPJ ROM within normal limits, bilateral.  Dorsally contracted digits absent. No other foot deformities. Integument: Warm, dry, supple, bilateral.  Onychocryptosis right medial hallux but no paronychia. Open lesion absent, bilateral.  Interdigital maceration absent to web spaces bilateral.   Nails left 2 thickened, dystrophic and crumbly, discolored with subungual debris. Fissures absent, bilateral. Hyperkeratotic tissue is absent. Asessment: Patient is a 41 y.o. female with:    Diagnosis Orders   1. Dermatophytosis of nail     2. Pain of toe of left foot           Plan: Patient examined and evaluated. Current condition and treatment options discussed in detail. Orders Placed This Encounter   Medications    ciclopirox (PENLAC) 8 % solution     Sig: Apply topically nightly. Dispense:  1 each     Refill:  3     Pt given tx options for anti fungal therapy. Pt educated on Rx po lamisil, Rx topical Penlac, OTC home remedies or other OTC topical meds. Patient is low level medical decision making overall. Patient is stable chronic condition. 1 new rx. .  Overall low risk morbidity the current treatment course. Contact office with any questions/problems/concerns.   RTC in 3 months

## 2023-09-20 ENCOUNTER — OFFICE VISIT (OUTPATIENT)
Dept: PODIATRY | Age: 48
End: 2023-09-20
Payer: COMMERCIAL

## 2023-09-20 VITALS
DIASTOLIC BLOOD PRESSURE: 74 MMHG | HEIGHT: 65 IN | BODY MASS INDEX: 27.32 KG/M2 | HEART RATE: 82 BPM | SYSTOLIC BLOOD PRESSURE: 118 MMHG | WEIGHT: 164 LBS

## 2023-09-20 DIAGNOSIS — M79.675 PAIN OF TOE OF LEFT FOOT: ICD-10-CM

## 2023-09-20 DIAGNOSIS — B35.1 DERMATOPHYTOSIS OF NAIL: Primary | ICD-10-CM

## 2023-09-20 PROCEDURE — 99213 OFFICE O/P EST LOW 20 MIN: CPT | Performed by: PODIATRIST

## 2023-09-20 NOTE — PROGRESS NOTES
meds.   Patient is low level medical decision making. Patient is stable chronic condition. 1 new prescription. Low risk morbidity  Contact office with any questions/problems/concerns.   RTC in 3 months

## 2024-02-14 ENCOUNTER — OFFICE VISIT (OUTPATIENT)
Dept: PRIMARY CARE CLINIC | Age: 49
End: 2024-02-14
Payer: COMMERCIAL

## 2024-02-14 VITALS
TEMPERATURE: 98.3 F | OXYGEN SATURATION: 96 % | SYSTOLIC BLOOD PRESSURE: 114 MMHG | BODY MASS INDEX: 27.56 KG/M2 | HEART RATE: 73 BPM | WEIGHT: 165.6 LBS | DIASTOLIC BLOOD PRESSURE: 78 MMHG

## 2024-02-14 DIAGNOSIS — J06.9 ACUTE UPPER RESPIRATORY INFECTION: Primary | ICD-10-CM

## 2024-02-14 PROCEDURE — 99213 OFFICE O/P EST LOW 20 MIN: CPT | Performed by: FAMILY MEDICINE

## 2024-02-14 NOTE — PROGRESS NOTES
Normocephalic and atraumatic.      Right Ear: Tympanic membrane and external ear normal.      Left Ear: External ear normal.      Nose: Congestion and rhinorrhea present.      Mouth/Throat:      Pharynx: No oropharyngeal exudate.   Eyes:      General: No scleral icterus.     Conjunctiva/sclera: Conjunctivae normal.   Neck:      Thyroid: No thyromegaly.   Cardiovascular:      Rate and Rhythm: Normal rate and regular rhythm.      Heart sounds: Normal heart sounds. No murmur heard.  Pulmonary:      Effort: Pulmonary effort is normal. No respiratory distress.      Breath sounds: Normal breath sounds. No wheezing.   Abdominal:      General: Bowel sounds are normal. There is no distension.      Palpations: Abdomen is soft.      Tenderness: There is no abdominal tenderness. There is no rebound.   Musculoskeletal:         General: No tenderness. Normal range of motion.      Cervical back: Neck supple.   Skin:     General: Skin is warm and dry.      Findings: No erythema or rash.   Neurological:      Mental Status: She is alert and oriented to person, place, and time.   Psychiatric:         Behavior: Behavior normal.         Thought Content: Thought content normal.         Judgment: Judgment normal.       /78   Pulse 73   Temp 98.3 °F (36.8 °C) (Tympanic)   Wt 75.1 kg (165 lb 9.6 oz)   SpO2 96%   BMI 27.56 kg/m²     Assessment:      Acute uri       Plan:      Discussed typical course, supportive care, and complications  Afrin/flonase, cont. Nyquil at night.         Michael Thornton MD

## 2024-02-16 ENCOUNTER — HOSPITAL ENCOUNTER (EMERGENCY)
Age: 49
Discharge: HOME | End: 2024-02-16
Attending: EMERGENCY MEDICINE
Payer: COMMERCIAL

## 2024-02-16 VITALS
HEART RATE: 80 BPM | TEMPERATURE: 99.3 F | OXYGEN SATURATION: 97 % | SYSTOLIC BLOOD PRESSURE: 113 MMHG | DIASTOLIC BLOOD PRESSURE: 66 MMHG | RESPIRATION RATE: 16 BRPM

## 2024-02-16 DIAGNOSIS — J10.1 INFLUENZA B: Primary | ICD-10-CM

## 2024-02-16 DIAGNOSIS — J06.9 ACUTE UPPER RESPIRATORY INFECTION: ICD-10-CM

## 2024-02-16 LAB
FLUAV AG SPEC QL: NEGATIVE
FLUBV AG SPEC QL: POSITIVE
SARS-COV-2 RDRP RESP QL NAA+PROBE: NOT DETECTED
SPECIMEN DESCRIPTION: NORMAL

## 2024-02-16 PROCEDURE — 99283 EMERGENCY DEPT VISIT LOW MDM: CPT

## 2024-02-16 PROCEDURE — 87804 INFLUENZA ASSAY W/OPTIC: CPT

## 2024-02-16 PROCEDURE — 87635 SARS-COV-2 COVID-19 AMP PRB: CPT

## 2024-02-16 PROCEDURE — 6370000000 HC RX 637 (ALT 250 FOR IP): Performed by: EMERGENCY MEDICINE

## 2024-02-16 RX ORDER — ONDANSETRON 4 MG/1
4 TABLET, ORALLY DISINTEGRATING ORAL ONCE
Status: COMPLETED | OUTPATIENT
Start: 2024-02-16 | End: 2024-02-16

## 2024-02-16 RX ORDER — IBUPROFEN 600 MG/1
600 TABLET ORAL
Status: COMPLETED | OUTPATIENT
Start: 2024-02-16 | End: 2024-02-16

## 2024-02-16 RX ORDER — ACETAMINOPHEN 500 MG
1000 TABLET ORAL
Status: COMPLETED | OUTPATIENT
Start: 2024-02-16 | End: 2024-02-16

## 2024-02-16 RX ADMIN — ONDANSETRON 4 MG: 4 TABLET, ORALLY DISINTEGRATING ORAL at 12:15

## 2024-02-16 RX ADMIN — ACETAMINOPHEN 1000 MG: 500 TABLET ORAL at 12:16

## 2024-02-16 RX ADMIN — IBUPROFEN 600 MG: 600 TABLET, FILM COATED ORAL at 12:15

## 2024-02-16 ASSESSMENT — PAIN SCALES - GENERAL
PAINLEVEL_OUTOF10: 5
PAINLEVEL_OUTOF10: 5

## 2024-02-16 ASSESSMENT — PAIN - FUNCTIONAL ASSESSMENT: PAIN_FUNCTIONAL_ASSESSMENT: 0-10

## 2024-02-16 ASSESSMENT — LIFESTYLE VARIABLES
HOW MANY STANDARD DRINKS CONTAINING ALCOHOL DO YOU HAVE ON A TYPICAL DAY: PATIENT DOES NOT DRINK
HOW OFTEN DO YOU HAVE A DRINK CONTAINING ALCOHOL: NEVER

## 2024-02-16 NOTE — DISCHARGE INSTRUCTIONS
Tylenol or Motrin as needed for pain and fever.  Take the medication as instructed.  Follow-up with your primary care doctor Monday morning for reevaluation.  Return to the emergency department with any problems or concerns as discussed.

## 2024-02-16 NOTE — ED PROVIDER NOTES
[facility]  [address] [facility] [address]  Phone: [phone]  EMERGENCY DEPARTMENT ENCOUNTER      Pt Name: Sinai Roberts  MRN: [id number]  Birthdate 1975  Date of evaluation: 2/16/2024    CHIEF COMPLAINT       Chief Complaint   Patient presents with    Fever    Headache    Pharyngitis       HISTORY OF PRESENT ILLNESS    Sinai Roberts is a 48 y.o. female who presents to the emergency department complaining of fever, sore throat, headache nasal congestion.  Patient states she is blowing her nose and getting thick yellow drainage.  Has a mild cough denies any chest pain, or shortness of breath.  She denies any wheezing.  Denies any nausea, vomiting, diarrhea, constipation, abdominal pain.  She denies any flank pain, hematuria, dysuria.  REVIEW OF SYSTEMS     Review of Systems   All other systems reviewed and are negative.    PAST MEDICAL HISTORY    has a past medical history of Allergic rhinitis, Anemia, Mild episode of recurrent major depressive disorder (HCC), Syncope, Uses contact lenses, and Wears glasses.    SURGICAL HISTORY      has a past surgical history that includes Tubal ligation; knee surgery (Right, 1992); and Tubal ligation (2013).    CURRENT MEDICATIONS       Discharge Medication List as of 2/16/2024 12:41 PM        CONTINUE these medications which have NOT CHANGED    Details   !! ciclopirox (PENLAC) 8 % solution Apply topically nightly., Disp-1 each, R-3, Normal      !! ciclopirox (PENLAC) 8 % solution Apply topically nightly., Disp-1 each, R-3, Normal       !! - Potential duplicate medications found. Please discuss with provider.          ALLERGIES     is allergic to pcn [penicillins].    FAMILY HISTORY     She indicated that her mother is alive. She indicated that her father is alive. She indicated that her brother is alive. She indicated that both of her daughters are alive. She indicated that her son is alive.     family history includes Cancer in her mother;  is found to have influenza.  She is of the window for Tamiflu.  Patient is nontoxic.  Advised to use decongestants, Flonase and we discussed that there is no indication for antibiotics at this time.    At this time the patient is without objective evidence of an acute process requiring hospitalization or inpatient management.  The patient has remained hemodynamically stable.  No additional indication for emergency studies at this time.  The patient understands that at this time there is no evidence for a more malignant underlying process, but also understands that early in the process of an illness or injury, an emergency department workup can be falsely reassuring.  Routine discharge counseling was given, and it is understood that worsening, changing or persistent symptoms should prompt an immediate call or follow up with their primary physician or return to the emergency department. The importance of appropriate follow up was also discussed.  I have reviewed the disposition diagnosis.  I have answered the questions and given discharge instructions.  There was voiced understanding of these instructions and no further questions or complaints.    FINAL IMPRESSION      1. Influenza B    2. Acute upper respiratory infection          DISPOSITION/PLAN   DISPOSITION Decision To Discharge 02/16/2024 12:39:40 PM        CONDITION ON DISPOSITION: STABLE       PATIENT REFERRED TO:  Libby Denton MD  [address] [place] [id number]  [phone]    Call in 1 day        DISCHARGE MEDICATIONS:  Discharge Medication List as of 2/16/2024 12:41 PM        START taking these medications    Details   loratadine-pseudoephedrine (CLARITIN-D 12HR) 5-120 MG per extended release tablet Take 1 tablet by mouth 2 times daily for 20 days, Disp-10 tablet, R-1Normal             (Please note that portions of this note were completed with a voice recognition program.  Efforts were made to edit the dictations but occasionally words are

## 2024-02-19 ENCOUNTER — OFFICE VISIT (OUTPATIENT)
Dept: FAMILY MEDICINE CLINIC | Age: 49
End: 2024-02-19
Payer: COMMERCIAL

## 2024-02-19 VITALS
SYSTOLIC BLOOD PRESSURE: 110 MMHG | OXYGEN SATURATION: 97 % | HEART RATE: 60 BPM | WEIGHT: 163.6 LBS | TEMPERATURE: 98.6 F | BODY MASS INDEX: 27.26 KG/M2 | DIASTOLIC BLOOD PRESSURE: 64 MMHG | HEIGHT: 65 IN

## 2024-02-19 DIAGNOSIS — J10.1 INFLUENZA B: Primary | ICD-10-CM

## 2024-02-19 DIAGNOSIS — Z11.59 ENCOUNTER FOR HEPATITIS C SCREENING TEST FOR LOW RISK PATIENT: ICD-10-CM

## 2024-02-19 DIAGNOSIS — Z13.220 SCREENING CHOLESTEROL LEVEL: ICD-10-CM

## 2024-02-19 DIAGNOSIS — Z11.4 SCREENING FOR HIV (HUMAN IMMUNODEFICIENCY VIRUS): ICD-10-CM

## 2024-02-19 DIAGNOSIS — Z13.1 SCREENING FOR DIABETES MELLITUS: ICD-10-CM

## 2024-02-19 PROCEDURE — 99213 OFFICE O/P EST LOW 20 MIN: CPT | Performed by: FAMILY MEDICINE

## 2024-02-19 SDOH — ECONOMIC STABILITY: HOUSING INSECURITY
IN THE LAST 12 MONTHS, WAS THERE A TIME WHEN YOU DID NOT HAVE A STEADY PLACE TO SLEEP OR SLEPT IN A SHELTER (INCLUDING NOW)?: NO

## 2024-02-19 SDOH — ECONOMIC STABILITY: FOOD INSECURITY: WITHIN THE PAST 12 MONTHS, YOU WORRIED THAT YOUR FOOD WOULD RUN OUT BEFORE YOU GOT MONEY TO BUY MORE.: NEVER TRUE

## 2024-02-19 SDOH — ECONOMIC STABILITY: INCOME INSECURITY: HOW HARD IS IT FOR YOU TO PAY FOR THE VERY BASICS LIKE FOOD, HOUSING, MEDICAL CARE, AND HEATING?: NOT HARD AT ALL

## 2024-02-19 SDOH — ECONOMIC STABILITY: FOOD INSECURITY: WITHIN THE PAST 12 MONTHS, THE FOOD YOU BOUGHT JUST DIDN'T LAST AND YOU DIDN'T HAVE MONEY TO GET MORE.: NEVER TRUE

## 2024-02-19 ASSESSMENT — ENCOUNTER SYMPTOMS
SORE THROAT: 0
CHEST TIGHTNESS: 1
WHEEZING: 0
ABDOMINAL PAIN: 1
CHOKING: 0
CONSTIPATION: 0
DIARRHEA: 0
COUGH: 1
TROUBLE SWALLOWING: 0
SHORTNESS OF BREATH: 0
NAUSEA: 0
SINUS PRESSURE: 1

## 2024-02-19 ASSESSMENT — PATIENT HEALTH QUESTIONNAIRE - PHQ9
SUM OF ALL RESPONSES TO PHQ9 QUESTIONS 1 & 2: 0
7. TROUBLE CONCENTRATING ON THINGS, SUCH AS READING THE NEWSPAPER OR WATCHING TELEVISION: 0
6. FEELING BAD ABOUT YOURSELF - OR THAT YOU ARE A FAILURE OR HAVE LET YOURSELF OR YOUR FAMILY DOWN: 0
8. MOVING OR SPEAKING SO SLOWLY THAT OTHER PEOPLE COULD HAVE NOTICED. OR THE OPPOSITE, BEING SO FIGETY OR RESTLESS THAT YOU HAVE BEEN MOVING AROUND A LOT MORE THAN USUAL: 0
9. THOUGHTS THAT YOU WOULD BE BETTER OFF DEAD, OR OF HURTING YOURSELF: 0
4. FEELING TIRED OR HAVING LITTLE ENERGY: 0
SUM OF ALL RESPONSES TO PHQ QUESTIONS 1-9: 0
2. FEELING DOWN, DEPRESSED OR HOPELESS: 0
3. TROUBLE FALLING OR STAYING ASLEEP: 0
SUM OF ALL RESPONSES TO PHQ QUESTIONS 1-9: 0
SUM OF ALL RESPONSES TO PHQ QUESTIONS 1-9: 0
1. LITTLE INTEREST OR PLEASURE IN DOING THINGS: 0
10. IF YOU CHECKED OFF ANY PROBLEMS, HOW DIFFICULT HAVE THESE PROBLEMS MADE IT FOR YOU TO DO YOUR WORK, TAKE CARE OF THINGS AT HOME, OR GET ALONG WITH OTHER PEOPLE: 0
5. POOR APPETITE OR OVEREATING: 0
SUM OF ALL RESPONSES TO PHQ QUESTIONS 1-9: 0

## 2024-02-19 NOTE — PROGRESS NOTES
[facility]  [address]  [facility] [address]  Dept: [phone]  Dept Fax: [phone]  Loc: [phone]    Sinai Roberts is a 48 y.o. female who presents today for her medical conditions/complaints as noted below.  Sinai Roberts is c/o of   Chief Complaint   Patient presents with    Other     Wanting a note for being off for Influenza. Was seen in the ER on 2/16/2024       HPI:     HPI Here today for influenza B.     She has not been feeling well since 2/12. She has missed work intermittently since then. She is feeling better since the fevers have stopped and she is no longer vomiting. She is still very worn out and she is still coughing a lot. She has been taking a lot of OTC meds to try to help. She is taking claritin with mild improvement. She did not get a flu shot this year. She has not had any syncope. She is not short of breath. She has not had any wheezing. She would like to have some time off of work so that she can recover. She first missed work on 2/16.       Past Medical History:   Diagnosis Date    Allergic rhinitis     Anemia     struggled with anemia in high school d/t heavy menstration    Mild episode of recurrent major depressive disorder (HCC) 10/16/2017    Syncope     passed out several times in HS    Uses contact lenses     Wears glasses     occasional          Social History     Tobacco Use    Smoking status: Never    Smokeless tobacco: Never    Tobacco comments:     Tried one or two cigarettes as a kid. Never a habit.   Substance Use Topics    Alcohol use: No     Comment: Haven't had alcohol in a year (since 2016)     Current Outpatient Medications   Medication Sig Dispense Refill    loratadine-pseudoephedrine (CLARITIN-D 12HR) 5-120 MG per extended release tablet Take 1 tablet by mouth 2 times daily for 20 days 10 tablet 1    ciclopirox (PENLAC) 8 % solution Apply topically nightly. (Patient

## 2024-02-23 ENCOUNTER — OFFICE VISIT (OUTPATIENT)
Dept: PODIATRY | Age: 49
End: 2024-02-23
Payer: COMMERCIAL

## 2024-02-23 VITALS
WEIGHT: 165 LBS | SYSTOLIC BLOOD PRESSURE: 120 MMHG | DIASTOLIC BLOOD PRESSURE: 70 MMHG | BODY MASS INDEX: 27.49 KG/M2 | HEART RATE: 78 BPM | HEIGHT: 65 IN

## 2024-02-23 DIAGNOSIS — M79.675 PAIN OF TOE OF LEFT FOOT: ICD-10-CM

## 2024-02-23 DIAGNOSIS — B35.1 DERMATOPHYTOSIS OF NAIL: Primary | ICD-10-CM

## 2024-02-23 PROCEDURE — 99213 OFFICE O/P EST LOW 20 MIN: CPT | Performed by: PODIATRIST

## 2024-02-23 RX ORDER — CICLOPIROX 80 MG/ML
SOLUTION TOPICAL
Qty: 1 EACH | Refills: 3 | Status: SHIPPED | OUTPATIENT
Start: 2024-02-23

## 2024-02-23 NOTE — PROGRESS NOTES
Subjective:  Sinai Roberts is a 48 y.o. female who presents to the office today for fungal nail changes.  Sore off and on.  No current tx.  Pt interested in tx options.  Pt never got rx from last vist last fall.  Currently denies F/C/N/V.     Allergies   Allergen Reactions    Pcn [Penicillins] Other (See Comments)     Unsure of reaction; happened as a child.       Past Medical History:   Diagnosis Date    Allergic rhinitis     Anemia     struggled with anemia in high school d/t heavy menstration    Mild episode of recurrent major depressive disorder (HCC) 10/16/2017    Syncope     passed out several times in HS    Uses contact lenses     Wears glasses     occasional       Prior to Admission medications    Medication Sig Start Date End Date Taking? Authorizing Provider   ciclopirox (PENLAC) 8 % solution Apply topically nightly. 2/23/24  Yes Eliud Patricio DPM   loratadine-pseudoephedrine (CLARITIN-D 12HR) 5-120 MG per extended release tablet Take 1 tablet by mouth 2 times daily for 20 days  Patient not taking: Reported on 2/23/2024 2/16/24 3/7/24  Rita Oliveira,    ciclopirox (PENLAC) 8 % solution Apply topically nightly.  Patient not taking: Reported on 2/14/2024 9/20/23   Eliud Patricio DPM       Past Surgical History:   Procedure Laterality Date    KNEE SURGERY Right 1992    Took an \"extra bone\" out of the right knee.    TUBAL LIGATION      TUBAL LIGATION  2013    Dr. Earl       Family History   Problem Relation Age of Onset    Cancer Mother         pancreatic cancer    Diabetes Mother        Social History     Tobacco Use    Smoking status: Never    Smokeless tobacco: Never    Tobacco comments:     Tried one or two cigarettes as a kid. Never a habit.   Substance Use Topics    Alcohol use: No     Comment: Haven't had alcohol in a year (since 2016)       ROS: All 14 ROS systems reviewed and pertinent positives noted above, all others negative.    Lower Extremity Physical Examination:     Vitals:

## 2024-03-19 ENCOUNTER — HOSPITAL ENCOUNTER (OUTPATIENT)
Age: 49
Discharge: HOME | End: 2024-03-19
Payer: COMMERCIAL

## 2024-03-19 ENCOUNTER — OFFICE VISIT (OUTPATIENT)
Dept: FAMILY MEDICINE CLINIC | Age: 49
End: 2024-03-19
Payer: COMMERCIAL

## 2024-03-19 VITALS
DIASTOLIC BLOOD PRESSURE: 64 MMHG | BODY MASS INDEX: 27.86 KG/M2 | SYSTOLIC BLOOD PRESSURE: 112 MMHG | HEIGHT: 65 IN | WEIGHT: 167.2 LBS | OXYGEN SATURATION: 97 % | HEART RATE: 68 BPM

## 2024-03-19 DIAGNOSIS — Z13.220 SCREENING CHOLESTEROL LEVEL: ICD-10-CM

## 2024-03-19 DIAGNOSIS — R53.82 CHRONIC FATIGUE: Primary | ICD-10-CM

## 2024-03-19 DIAGNOSIS — Z11.59 ENCOUNTER FOR HEPATITIS C SCREENING TEST FOR LOW RISK PATIENT: ICD-10-CM

## 2024-03-19 DIAGNOSIS — F33.0 MILD EPISODE OF RECURRENT MAJOR DEPRESSIVE DISORDER (HCC): ICD-10-CM

## 2024-03-19 DIAGNOSIS — Z11.4 SCREENING FOR HIV (HUMAN IMMUNODEFICIENCY VIRUS): ICD-10-CM

## 2024-03-19 DIAGNOSIS — Z12.11 SCREENING FOR COLON CANCER: ICD-10-CM

## 2024-03-19 DIAGNOSIS — Z13.1 SCREENING FOR DIABETES MELLITUS: ICD-10-CM

## 2024-03-19 DIAGNOSIS — R53.82 CHRONIC FATIGUE: ICD-10-CM

## 2024-03-19 LAB
ALBUMIN SERPL-MCNC: 4.1 G/DL (ref 3.5–5.2)
ALBUMIN/GLOB SERPL: 1.2 {RATIO} (ref 1–2.5)
ALP SERPL-CCNC: 66 U/L (ref 35–104)
ALT SERPL-CCNC: 17 U/L (ref 5–33)
ANION GAP SERPL CALCULATED.3IONS-SCNC: 10 MMOL/L (ref 9–17)
AST SERPL-CCNC: 29 U/L
BASOPHILS # BLD: <0.03 K/UL (ref 0–0.2)
BASOPHILS NFR BLD: 0 % (ref 0–2)
BILIRUB SERPL-MCNC: 0.7 MG/DL (ref 0.3–1.2)
BUN SERPL-MCNC: 11 MG/DL (ref 6–20)
BUN/CREAT SERPL: 16 (ref 9–20)
CALCIUM SERPL-MCNC: 8.9 MG/DL (ref 8.6–10.4)
CHLORIDE SERPL-SCNC: 104 MMOL/L (ref 98–107)
CO2 SERPL-SCNC: 25 MMOL/L (ref 20–31)
CREAT SERPL-MCNC: 0.7 MG/DL (ref 0.5–0.9)
EOSINOPHIL # BLD: 0.05 K/UL (ref 0–0.44)
EOSINOPHILS RELATIVE PERCENT: 1 % (ref 1–4)
ERYTHROCYTE [DISTWIDTH] IN BLOOD BY AUTOMATED COUNT: 13.4 % (ref 11.8–14.4)
EST. AVERAGE GLUCOSE BLD GHB EST-MCNC: 105 MG/DL
GFR SERPL CREATININE-BSD FRML MDRD: >60 ML/MIN/1.73M2
GLUCOSE SERPL-MCNC: 91 MG/DL (ref 70–99)
HBA1C MFR BLD: 5.3 % (ref 4–6)
HCT VFR BLD AUTO: 38.3 % (ref 36.3–47.1)
HGB BLD-MCNC: 12.5 G/DL (ref 11.9–15.1)
IMM GRANULOCYTES # BLD AUTO: <0.03 K/UL (ref 0–0.3)
IMM GRANULOCYTES NFR BLD: 1 %
LYMPHOCYTES NFR BLD: 1.22 K/UL (ref 1.1–3.7)
LYMPHOCYTES RELATIVE PERCENT: 29 % (ref 24–43)
MCH RBC QN AUTO: 29.3 PG (ref 25.2–33.5)
MCHC RBC AUTO-ENTMCNC: 32.6 G/DL (ref 25.2–33.5)
MCV RBC AUTO: 89.7 FL (ref 82.6–102.9)
MONOCYTES NFR BLD: 0.4 K/UL (ref 0.1–1.2)
MONOCYTES NFR BLD: 10 % (ref 3–12)
NEUTROPHILS NFR BLD: 59 % (ref 36–65)
NEUTS SEG NFR BLD: 2.46 K/UL (ref 1.5–8.1)
NRBC BLD-RTO: 0 PER 100 WBC
PLATELET # BLD AUTO: 239 K/UL (ref 138–453)
PMV BLD AUTO: 9.9 FL (ref 8.1–13.5)
POTASSIUM SERPL-SCNC: 4 MMOL/L (ref 3.7–5.3)
PROT SERPL-MCNC: 7.6 G/DL (ref 6.4–8.3)
RBC # BLD AUTO: 4.27 M/UL (ref 3.95–5.11)
SODIUM SERPL-SCNC: 139 MMOL/L (ref 135–144)
TSH SERPL DL<=0.05 MIU/L-ACNC: 1.69 UIU/ML (ref 0.3–5)
WBC OTHER # BLD: 4.2 K/UL (ref 3.5–11.3)

## 2024-03-19 PROCEDURE — 82607 VITAMIN B-12: CPT

## 2024-03-19 PROCEDURE — 80061 LIPID PANEL: CPT

## 2024-03-19 PROCEDURE — 82306 VITAMIN D 25 HYDROXY: CPT

## 2024-03-19 PROCEDURE — 82746 ASSAY OF FOLIC ACID SERUM: CPT

## 2024-03-19 PROCEDURE — 85025 COMPLETE CBC W/AUTO DIFF WBC: CPT

## 2024-03-19 PROCEDURE — 99213 OFFICE O/P EST LOW 20 MIN: CPT | Performed by: FAMILY MEDICINE

## 2024-03-19 PROCEDURE — 36415 COLL VENOUS BLD VENIPUNCTURE: CPT

## 2024-03-19 PROCEDURE — 87389 HIV-1 AG W/HIV-1&-2 AB AG IA: CPT

## 2024-03-19 PROCEDURE — 83036 HEMOGLOBIN GLYCOSYLATED A1C: CPT

## 2024-03-19 PROCEDURE — 84443 ASSAY THYROID STIM HORMONE: CPT

## 2024-03-19 PROCEDURE — 80053 COMPREHEN METABOLIC PANEL: CPT

## 2024-03-19 PROCEDURE — 86803 HEPATITIS C AB TEST: CPT

## 2024-03-19 ASSESSMENT — ENCOUNTER SYMPTOMS
COUGH: 0
CHEST TIGHTNESS: 0
ABDOMINAL PAIN: 0
WHEEZING: 0
SHORTNESS OF BREATH: 0
DIARRHEA: 0
NAUSEA: 0
CONSTIPATION: 0

## 2024-03-19 NOTE — PROGRESS NOTES
[facility] A DEPARTMENT OF [facility]  [address] [facility] [address]  Dept: [phone]  Dept Fax: [phone]  Loc: [phone]    Sinai Roberts is a 48 y.o. female who presents today for her medical conditions/complaints as noted below.  Sinai Roberts is c/o of   Chief Complaint   Patient presents with    Other     Patient stated when she was younger she had anemia when she was younger and was questioning if it was still effecting her because she gets tired       HPI:     HPI Here today for fatigue.     She has been tired recently. She was anemic as a child and she wonders if that is still causing her fatigue. She tries to eat pretty healthy. She is very physically active at work and she works night. She is not sleeping very well during the day because she is constantly running around with her kids. She is not having any issues with headaches. No issues with chest pain or shortness of breath. She has not had any palpitations. No issues with leg swelling. She is not having any issues with lightheadedness or dizziness. No syncope. She has not had any issues with feeling depressed or anxious. She still has periods. They are occasionally heavy. No blood in her stool. No blood in the urine. She never had to get iron infusions as a kid.       Past Medical History:   Diagnosis Date    Allergic rhinitis     Anemia     struggled with anemia in high school d/t heavy menstration    Mild episode of recurrent major depressive disorder (HCC) 10/16/2017    Syncope     passed out several times in HS    Uses contact lenses     Wears glasses     occasional          Social History     Tobacco Use    Smoking status: Never    Smokeless tobacco: Never    Tobacco comments:     Tried one or two cigarettes as a kid. Never a habit.   Substance Use Topics    Alcohol use: No     Comment: Haven't had alcohol in a year (since 2016)     Current Outpatient Medications

## 2024-03-20 LAB
25(OH)D3 SERPL-MCNC: 18.2 NG/ML (ref 30–100)
CHOLEST SERPL-MCNC: 195 MG/DL (ref 0–199)
CHOLESTEROL/HDL RATIO: 3
FOLATE SERPL-MCNC: 19.3 NG/ML (ref 4.8–24.2)
HCV AB SERPL QL IA: NONREACTIVE
HDLC SERPL-MCNC: 63 MG/DL
HIV 1+2 AB+HIV1 P24 AG SERPL QL IA: NONREACTIVE
LDLC SERPL CALC-MCNC: 117 MG/DL (ref 0–100)
TRIGL SERPL-MCNC: 72 MG/DL
VIT B12 SERPL-MCNC: 341 PG/ML (ref 232–1245)
VLDLC SERPL CALC-MCNC: 14 MG/DL

## 2024-03-20 RX ORDER — ERGOCALCIFEROL 1.25 MG/1
50000 CAPSULE ORAL WEEKLY
Qty: 12 CAPSULE | Refills: 1 | Status: SHIPPED | OUTPATIENT
Start: 2024-03-20

## 2024-10-08 ENCOUNTER — HOSPITAL ENCOUNTER (EMERGENCY)
Age: 49
Discharge: HOME | End: 2024-10-08
Attending: EMERGENCY MEDICINE
Payer: COMMERCIAL

## 2024-10-08 VITALS
RESPIRATION RATE: 16 BRPM | SYSTOLIC BLOOD PRESSURE: 101 MMHG | BODY MASS INDEX: 26.66 KG/M2 | DIASTOLIC BLOOD PRESSURE: 50 MMHG | WEIGHT: 160 LBS | HEIGHT: 65 IN | HEART RATE: 64 BPM | TEMPERATURE: 98.9 F | OXYGEN SATURATION: 96 %

## 2024-10-08 DIAGNOSIS — R42 DIZZINESS: Primary | ICD-10-CM

## 2024-10-08 LAB
ANION GAP SERPL CALCULATED.3IONS-SCNC: 8 MMOL/L (ref 9–17)
BASOPHILS # BLD: <0.03 K/UL (ref 0–0.2)
BASOPHILS NFR BLD: 0 % (ref 0–2)
BILIRUB UR QL STRIP: NEGATIVE
BUN SERPL-MCNC: 17 MG/DL (ref 6–20)
BUN/CREAT SERPL: 21 (ref 9–20)
CALCIUM SERPL-MCNC: 9.3 MG/DL (ref 8.6–10.4)
CHLORIDE SERPL-SCNC: 104 MMOL/L (ref 98–107)
CLARITY UR: CLEAR
CO2 SERPL-SCNC: 27 MMOL/L (ref 20–31)
COLOR UR: YELLOW
COMMENT: ABNORMAL
CREAT SERPL-MCNC: 0.8 MG/DL (ref 0.5–0.9)
EOSINOPHIL # BLD: 0.12 K/UL (ref 0–0.44)
EOSINOPHILS RELATIVE PERCENT: 3 % (ref 1–4)
ERYTHROCYTE [DISTWIDTH] IN BLOOD BY AUTOMATED COUNT: 13.6 % (ref 11.8–14.4)
GFR, ESTIMATED: >90 ML/MIN/1.73M2
GLUCOSE SERPL-MCNC: 97 MG/DL (ref 70–99)
GLUCOSE UR STRIP-MCNC: NEGATIVE MG/DL
HCG SERPL QL: NEGATIVE
HCT VFR BLD AUTO: 39 % (ref 36.3–47.1)
HGB BLD-MCNC: 13.3 G/DL (ref 11.9–15.1)
HGB UR QL STRIP.AUTO: NEGATIVE
IMM GRANULOCYTES # BLD AUTO: <0.03 K/UL (ref 0–0.3)
IMM GRANULOCYTES NFR BLD: 0 %
KETONES UR STRIP-MCNC: ABNORMAL MG/DL
LEUKOCYTE ESTERASE UR QL STRIP: NEGATIVE
LYMPHOCYTES NFR BLD: 1.35 K/UL (ref 1.1–3.7)
LYMPHOCYTES RELATIVE PERCENT: 29 % (ref 24–43)
MCH RBC QN AUTO: 30.4 PG (ref 25.2–33.5)
MCHC RBC AUTO-ENTMCNC: 34.1 G/DL (ref 25.2–33.5)
MCV RBC AUTO: 89.2 FL (ref 82.6–102.9)
MONOCYTES NFR BLD: 0.36 K/UL (ref 0.1–1.2)
MONOCYTES NFR BLD: 8 % (ref 3–12)
NEUTROPHILS NFR BLD: 60 % (ref 36–65)
NEUTS SEG NFR BLD: 2.79 K/UL (ref 1.5–8.1)
NITRITE UR QL STRIP: NEGATIVE
NRBC BLD-RTO: 0 PER 100 WBC
PH UR STRIP: 6 [PH] (ref 5–6)
PLATELET # BLD AUTO: 278 K/UL (ref 138–453)
PMV BLD AUTO: 9.4 FL (ref 8.1–13.5)
POTASSIUM SERPL-SCNC: 3.6 MMOL/L (ref 3.7–5.3)
PROT UR STRIP-MCNC: NEGATIVE MG/DL
RBC # BLD AUTO: 4.37 M/UL (ref 3.95–5.11)
SODIUM SERPL-SCNC: 139 MMOL/L (ref 135–144)
SP GR UR STRIP: 1.02 (ref 1.01–1.02)
TROPONIN I SERPL HS-MCNC: <6 NG/L (ref 0–14)
UROBILINOGEN UR STRIP-ACNC: NORMAL EU/DL (ref 0–1)
WBC OTHER # BLD: 4.7 K/UL (ref 3.5–11.3)

## 2024-10-08 PROCEDURE — 85025 COMPLETE CBC W/AUTO DIFF WBC: CPT

## 2024-10-08 PROCEDURE — 80048 BASIC METABOLIC PNL TOTAL CA: CPT

## 2024-10-08 PROCEDURE — 81003 URINALYSIS AUTO W/O SCOPE: CPT

## 2024-10-08 PROCEDURE — 84703 CHORIONIC GONADOTROPIN ASSAY: CPT

## 2024-10-08 PROCEDURE — 36415 COLL VENOUS BLD VENIPUNCTURE: CPT

## 2024-10-08 PROCEDURE — 84484 ASSAY OF TROPONIN QUANT: CPT

## 2024-10-08 PROCEDURE — 6370000000 HC RX 637 (ALT 250 FOR IP): Performed by: EMERGENCY MEDICINE

## 2024-10-08 PROCEDURE — 99284 EMERGENCY DEPT VISIT MOD MDM: CPT

## 2024-10-08 PROCEDURE — 93005 ELECTROCARDIOGRAM TRACING: CPT | Performed by: EMERGENCY MEDICINE

## 2024-10-08 RX ORDER — MECLIZINE HCL 12.5 MG 12.5 MG/1
25 TABLET ORAL ONCE
Status: COMPLETED | OUTPATIENT
Start: 2024-10-08 | End: 2024-10-08

## 2024-10-08 RX ADMIN — MECLIZINE 25 MG: 12.5 TABLET ORAL at 22:20

## 2024-10-08 ASSESSMENT — LIFESTYLE VARIABLES
HOW OFTEN DO YOU HAVE A DRINK CONTAINING ALCOHOL: NEVER
HOW MANY STANDARD DRINKS CONTAINING ALCOHOL DO YOU HAVE ON A TYPICAL DAY: PATIENT DOES NOT DRINK

## 2024-10-08 ASSESSMENT — PAIN - FUNCTIONAL ASSESSMENT: PAIN_FUNCTIONAL_ASSESSMENT: NONE - DENIES PAIN

## 2024-10-09 LAB
EKG ATRIAL RATE: 63 BPM
EKG P AXIS: 65 DEGREES
EKG P-R INTERVAL: 144 MS
EKG Q-T INTERVAL: 410 MS
EKG QRS DURATION: 84 MS
EKG QTC CALCULATION (BAZETT): 419 MS
EKG R AXIS: 63 DEGREES
EKG T AXIS: 59 DEGREES
EKG VENTRICULAR RATE: 63 BPM

## 2024-10-09 NOTE — DISCHARGE INSTRUCTIONS
Increase fluids as tolerated    Return immediately if any worsening symptoms or any other concerns    Please understand that early in the process of an illness or injury, an emergency department workup can be falsely reassuring.      Tell us how we did visit: [email or web address]   and let us know about your experience

## 2024-10-09 NOTE — ED PROVIDER NOTES
[facility]  [address] [facility] [address]  Phone: [phone]  EMERGENCY DEPARTMENT ENCOUNTER      Pt Name: Sinai Roberts  MRN: [id number]  Birthdate 1975  Date of evaluation: 10/8/2024    CHIEF COMPLAINT       Chief Complaint   Patient presents with    Dizziness       HISTORY OF PRESENT ILLNESS    Sinai Roberts is a 49 y.o. female who presents to the emergency room complaining of dizziness.  Symptoms started this afternoon.  Patient states that she works third shift went to bed after dropping her kids off at school and felt fine.  When she woke up around 4:00 this afternoon she felt very dizzy while sitting on the couch as if she was going to pass out but she also felt off balance.  Denies any headache blurry vision or double vision.  No chest pain or shortness of breath.  She said that everything got dizzy while she was sitting there and she felt off balance.  She said she felt like the room was spinning.  She was driving to work this evening and felt lightheaded still so she came to the emergency department.  Denies history of vertigo.    REVIEW OF SYSTEMS       Constitutional: No fevers or chills positive dizziness/lightheadedness  HENT: No sore throat, rhinorrhea, or earache   Eyes: No blurry vision or double vision no drainage   Cardiovascular: No chest pain or tachycardia   Respiratory: No wheezing or shortness of breath no cough   Gastrointestinal: No nausea, vomiting, diarrhea, constipation, or abdominal pain   : No hematuria or dysuria   Musculoskeletal: No swelling or pain   Skin: No rash   Neurological: No focal neurologic complaints, paresthesias, weakness, or headache     PAST MEDICAL HISTORY    has a past medical history of Allergic rhinitis, Anemia, Mild episode of recurrent major depressive disorder (HCC), Syncope, Uses contact lenses, and Wears glasses.    SURGICAL HISTORY      has a past surgical history that includes Tubal ligation; knee surgery (Right, 1992);

## 2025-05-09 ENCOUNTER — PATIENT MESSAGE (OUTPATIENT)
Dept: FAMILY MEDICINE CLINIC | Age: 50
End: 2025-05-09

## 2025-05-09 ENCOUNTER — OFFICE VISIT (OUTPATIENT)
Dept: PRIMARY CARE CLINIC | Age: 50
End: 2025-05-09
Payer: COMMERCIAL

## 2025-05-09 VITALS
HEIGHT: 66 IN | WEIGHT: 175 LBS | TEMPERATURE: 97.7 F | DIASTOLIC BLOOD PRESSURE: 70 MMHG | OXYGEN SATURATION: 98 % | SYSTOLIC BLOOD PRESSURE: 110 MMHG | HEART RATE: 61 BPM | BODY MASS INDEX: 28.12 KG/M2

## 2025-05-09 DIAGNOSIS — M54.41 ACUTE RIGHT-SIDED LOW BACK PAIN WITH RIGHT-SIDED SCIATICA: Primary | ICD-10-CM

## 2025-05-09 PROCEDURE — 99213 OFFICE O/P EST LOW 20 MIN: CPT | Performed by: FAMILY MEDICINE

## 2025-05-09 RX ORDER — CYCLOBENZAPRINE HCL 5 MG
5 TABLET ORAL 3 TIMES DAILY PRN
Qty: 30 TABLET | Refills: 0 | Status: SHIPPED | OUTPATIENT
Start: 2025-05-09 | End: 2025-05-19

## 2025-05-09 RX ORDER — PREDNISONE 20 MG/1
TABLET ORAL
Qty: 10 TABLET | Refills: 0 | Status: SHIPPED | OUTPATIENT
Start: 2025-05-09

## 2025-05-09 ASSESSMENT — PATIENT HEALTH QUESTIONNAIRE - PHQ9: DEPRESSION UNABLE TO ASSESS: PT REFUSES

## 2025-05-12 ENCOUNTER — PATIENT MESSAGE (OUTPATIENT)
Dept: FAMILY MEDICINE CLINIC | Age: 50
End: 2025-05-12

## 2025-05-13 ASSESSMENT — ENCOUNTER SYMPTOMS: BACK PAIN: 1

## 2025-05-13 NOTE — TELEPHONE ENCOUNTER
This is ok except for the part with future injuries of the lower back this restriction can be reapplied for 3 days as I only saw her for the acute issue.  If this is a recurrent issue, then she should follow up with her PCP for more permanent restrictions.  I am only really able to give her temporary restrictions due to the acute issue I saw her for.

## 2025-05-13 NOTE — PROGRESS NOTES
2025     Sinai Roberts (:  1975) is a 50 y.o. female, here for evaluation of the following medical concerns:    Back Pain        History of Present Illness  The patient presents for evaluation of acute low back pain.    She reports an exacerbation of her lower back pain, which she attributes to a work-related incident on Thursday night. The severity of the pain escalated to the point where she was unable to ambulate upon awakening on Saturday. She sought chiropractic care that day, with the chiropractor recommending a 3-day rest period. However, she is scheduled to return to work on  night and is concerned about her ability to perform her duties without restrictions. 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 She also reports groin pain, which has improved since her chiropractic treatment. She has a massage appointment scheduled for later today. She has taken ibuprofen and applied ice to the affected area, both of which provided some relief. She has previously taken prednisone.    SOCIAL HISTORY  She works at [employer].      Review of Systems   Musculoskeletal:  Positive for arthralgias, back pain and myalgias.       Prior to Visit Medications    Medication Sig Taking? Authorizing Provider   cyclobenzaprine (FLEXERIL) 5 MG tablet Take 1 tablet by mouth 3 times daily as needed for Muscle spasms Yes Gladis Crandall W, DO   predniSONE (DELTASONE) 20 MG tablet 1 bid for 5 days Yes Gladis Crandall W, DO   vitamin D (ERGOCALCIFEROL) 1.25 MG (77239 UT) CAPS capsule Take 1 capsule by mouth once a week  Patient not taking: Reported on 2025  Libby Denton MD   ciclopirox (PENLAC) 8 % solution Apply topically nightly.  Patient not taking: Reported on 2025  Eliud Patricio DPM   ciclopirox (PENLAC) 8 % solution Apply topically nightly.  Patient not

## 2025-05-15 ENCOUNTER — TELEPHONE (OUTPATIENT)
Dept: FAMILY MEDICINE CLINIC | Age: 50
End: 2025-05-15

## 2025-05-15 ENCOUNTER — OFFICE VISIT (OUTPATIENT)
Dept: FAMILY MEDICINE CLINIC | Age: 50
End: 2025-05-15
Payer: COMMERCIAL

## 2025-05-15 VITALS
SYSTOLIC BLOOD PRESSURE: 108 MMHG | DIASTOLIC BLOOD PRESSURE: 74 MMHG | BODY MASS INDEX: 28.32 KG/M2 | WEIGHT: 176.2 LBS | HEART RATE: 60 BPM | OXYGEN SATURATION: 98 % | HEIGHT: 66 IN

## 2025-05-15 DIAGNOSIS — M25.561 CHRONIC PAIN OF RIGHT KNEE: ICD-10-CM

## 2025-05-15 DIAGNOSIS — Z12.31 ENCOUNTER FOR SCREENING MAMMOGRAM FOR MALIGNANT NEOPLASM OF BREAST: ICD-10-CM

## 2025-05-15 DIAGNOSIS — M54.50 CHRONIC BILATERAL LOW BACK PAIN WITHOUT SCIATICA: Primary | ICD-10-CM

## 2025-05-15 DIAGNOSIS — G89.29 CHRONIC PAIN OF RIGHT KNEE: ICD-10-CM

## 2025-05-15 DIAGNOSIS — G89.29 CHRONIC BILATERAL LOW BACK PAIN WITHOUT SCIATICA: Primary | ICD-10-CM

## 2025-05-15 DIAGNOSIS — E55.9 VITAMIN D DEFICIENCY: ICD-10-CM

## 2025-05-15 PROCEDURE — 99214 OFFICE O/P EST MOD 30 MIN: CPT | Performed by: FAMILY MEDICINE

## 2025-05-15 RX ORDER — ERGOCALCIFEROL 1.25 MG/1
50000 CAPSULE, LIQUID FILLED ORAL WEEKLY
Qty: 12 CAPSULE | Refills: 3 | Status: SHIPPED | OUTPATIENT
Start: 2025-05-15

## 2025-05-15 SDOH — ECONOMIC STABILITY: FOOD INSECURITY: WITHIN THE PAST 12 MONTHS, THE FOOD YOU BOUGHT JUST DIDN'T LAST AND YOU DIDN'T HAVE MONEY TO GET MORE.: NEVER TRUE

## 2025-05-15 SDOH — ECONOMIC STABILITY: FOOD INSECURITY: WITHIN THE PAST 12 MONTHS, YOU WORRIED THAT YOUR FOOD WOULD RUN OUT BEFORE YOU GOT MONEY TO BUY MORE.: NEVER TRUE

## 2025-05-15 ASSESSMENT — PATIENT HEALTH QUESTIONNAIRE - PHQ9
2. FEELING DOWN, DEPRESSED OR HOPELESS: NOT AT ALL
SUM OF ALL RESPONSES TO PHQ QUESTIONS 1-9: 0
5. POOR APPETITE OR OVEREATING: NOT AT ALL
SUM OF ALL RESPONSES TO PHQ QUESTIONS 1-9: 0
10. IF YOU CHECKED OFF ANY PROBLEMS, HOW DIFFICULT HAVE THESE PROBLEMS MADE IT FOR YOU TO DO YOUR WORK, TAKE CARE OF THINGS AT HOME, OR GET ALONG WITH OTHER PEOPLE: NOT DIFFICULT AT ALL
9. THOUGHTS THAT YOU WOULD BE BETTER OFF DEAD, OR OF HURTING YOURSELF: NOT AT ALL
7. TROUBLE CONCENTRATING ON THINGS, SUCH AS READING THE NEWSPAPER OR WATCHING TELEVISION: NOT AT ALL
SUM OF ALL RESPONSES TO PHQ QUESTIONS 1-9: 0
8. MOVING OR SPEAKING SO SLOWLY THAT OTHER PEOPLE COULD HAVE NOTICED. OR THE OPPOSITE, BEING SO FIGETY OR RESTLESS THAT YOU HAVE BEEN MOVING AROUND A LOT MORE THAN USUAL: NOT AT ALL
3. TROUBLE FALLING OR STAYING ASLEEP: NOT AT ALL
4. FEELING TIRED OR HAVING LITTLE ENERGY: NOT AT ALL
6. FEELING BAD ABOUT YOURSELF - OR THAT YOU ARE A FAILURE OR HAVE LET YOURSELF OR YOUR FAMILY DOWN: NOT AT ALL
SUM OF ALL RESPONSES TO PHQ QUESTIONS 1-9: 0
1. LITTLE INTEREST OR PLEASURE IN DOING THINGS: NOT AT ALL

## 2025-05-15 ASSESSMENT — ENCOUNTER SYMPTOMS
WHEEZING: 0
BACK PAIN: 1
COUGH: 0
SHORTNESS OF BREATH: 0
CHEST TIGHTNESS: 0

## 2025-05-15 NOTE — TELEPHONE ENCOUNTER
Patient had appointment with FMLA  forms. Intake and LAYO filled out. Intake form, LAYO and forms given to PCP nurse. LAYO scanned into chart.

## 2025-05-15 NOTE — PROGRESS NOTES
[facility] A DEPART[facility]  [address] [facility] [address]  Dept: [phone]  Dept Fax: [phone]  Loc: [phone]    Sinai Roberts  is a 50 y.o. female who presents today for her medical conditions/complaints as noted below.  Sinai Roberts is c/o of   Chief Complaint   Patient presents with    Follow-up     [facility] 5/9/2025 Back Pain    Other     Discuss Vit D       HPI:     History of Present Illness  The patient is a 50-year-old female who presents today for follow-up of her back pain and has questions about her vitamin D level.    She experienced a severe episode of back pain on 05/08/2025, necessitating an urgent care visit. The pain was so intense that it impeded her mobility upon waking. Despite seeking chiropractic intervention, she was unable to attend work that night. She has requested [place] paperwork due to the persistent nature of her back pain. She is considering physical therapy as a potential treatment option. Her occupation at [employer] involves constant movement and repetitive tasks, which she suspects may be contributing to her back pain. Initially localized to the right side, her back pain has since spread to her tailbone area following a chiropractic adjustment. She reports no radiating pain down the back of her leg to her toes. She has been managing her pain with Tylenol and ibuprofen, supplemented by muscle relaxers prescribed during her urgent care visit. She has not yet started her prednisone course. She has not been provided with any stretching exercises.    She reports intermittent knee pain, which she describes as slightly swollen. She is uncertain if her back pain is exacerbating her knee discomfort. She has a history of cross-country running and was informed of an extra bone in her knee, which was surgically removed. She reports no instances of her knee giving way but notes occasional limping and

## 2025-05-28 ENCOUNTER — HOSPITAL ENCOUNTER (OUTPATIENT)
Dept: PHYSICAL THERAPY | Age: 50
Setting detail: THERAPIES SERIES
Discharge: HOME | End: 2025-05-28
Attending: FAMILY MEDICINE
Payer: COMMERCIAL

## 2025-05-28 PROCEDURE — 97161 PT EVAL LOW COMPLEX 20 MIN: CPT | Performed by: PHYSICAL THERAPIST

## 2025-05-28 ASSESSMENT — PAIN DESCRIPTION - LOCATION: LOCATION: BACK

## 2025-05-28 ASSESSMENT — PAIN SCALES - GENERAL: PAINLEVEL_OUTOF10: 0

## 2025-05-28 ASSESSMENT — PAIN DESCRIPTION - PAIN TYPE: TYPE: CHRONIC PAIN

## 2025-05-28 ASSESSMENT — PAIN DESCRIPTION - ORIENTATION: ORIENTATION: RIGHT

## 2025-05-28 ASSESSMENT — PAIN DESCRIPTION - DESCRIPTORS: DESCRIPTORS: ACHING

## 2025-05-28 NOTE — FLOWSHEET NOTE
Physical Therapy Daily Treatment Note    Date:  2025    Patient Name:  Sinai Roberts    :  1975  MRN: [id number]  Restrictions/Precautions:     Medical/Treatment Diagnosis Information:    Chronic bilateral low back pain without sciatica [M54.50, G89.29]  Chronic pain of right knee [M25.561, G89.29]         Insurance/Certification information:   [employer]   Physician Information:   Libby Denton MD   Plan of care signed (Y/N):  n  Visit# / total visits:  1/10  Pain level: 0/10       Time In:900   Time Out:940    Progress Note: [x]  Yes  []  No  Next due by: Visit #10  Or by    Subjective:   see eval    Objective: see eval  Observation:   Test measurements:      Exercises:   Exercise/Equipment Resistance/Repetitions Other comments         Prone Prop      Prone Press up      Prone BKF      Prone hip extension            clamshell      LTR      bridges      PPT             Modified ext        standing ext        HR/TR, HS curls        hip abd/ext                TB rows/ext       SPO          Counter Exs      Squats     Lunges      Side Step with Squat      Monster walks                            [] Provided verbal/tactile cueing for activities related to strengthening, flexibility, endurance, ROM. (30945)  [] Provided verbal/tactile cueing for activities related to improving balance, coordination, kinesthetic sense, posture, motor skill, proprioception. (53273)    Therapeutic Activities:     [] Therapeutic activities, direct (one-on-one) patient contact (use of dynamic activities to improve functional performance). (15324)    Gait:   [] Provided training and instruction to the patient for ambulation re-education. (20206)    Self-Care/ADL's  [] Self-care/home management training and compensatory training, meal preparation, safety procedures, and instructions in use of assistive technology devices/adaptive equipment, direct one-on-one contact. (72609)    Home Exercise Program:     [] Reviewed/Progressed HEP

## 2025-05-28 NOTE — PLAN OF CARE
[facility] [facility]  Rehabilitation and Sports Medicine    [x] [place]  Phone: [phone]  Fax: [phone]      [] [place]  Phone: [phone]  Fax: [phone]        To: Libby Denton MD       Patient: Sinai Roberts  : 1975   MRN: [id number]  Evaluation Date: 2025      Diagnosis Information:   Chronic bilateral low back pain without sciatica [M54.50, G89.29]  Chronic pain of right knee [M25.561, G89.29]           Physical Therapy Certification  Dear Libby Denton MD   The following patient has been evaluated for physical therapy services and for therapy to continue, Medicare requires monthly physician review of the treatment plan. Please review the attached evaluation and/or summary of the patient's plan of care, and verify that you agree therapy should continue by signing the attached document and sending it back to our office.    Plan of Care/Treatment to date:  [x] Therapeutic Exercise    [x] Modalities:  [x] Therapeutic Activity     [] Ultrasound  [] Electrical Stimulation  [x] Gait Training      [] Cervical Traction [] Lumbar Traction  [x] Neuromuscular Re-education    [] Cold/hotpack [] Iontophoresis   [x] Instruction in HEP     Other:  [x] Manual Therapy      []             [] Aquatic Therapy      []                 Goals:  Short Term Goals  Time Frame for Short Term Goals: 3 weeks  Short Term Goal 1: Initiate HEP    Long Term Goals  Time Frame for Long Term Goals : 6 weeks  Long Term Goal 1: Independent in HEP  Long Term Goal 2: Patient to note no pain with pushing and pulling tasks up to 100#'s  Long Term Goal 3: Patient to demonstrate proper body mechanics with lifting tasks of 10-20#'s  Long Term Goal 4: Eliminate radicular symptoms  Long Term Goal 5: Improve Oswestry to 5% Disabiltiy or less to improve tolerance to ADLs    Frequency/Duration:25-25  # Days per week: [] 1 day # Weeks: [] 1 week [] 5 weeks     [x] 2 days   [] 2 weeks [x] 6 weeks     [x] 3

## 2025-05-29 ENCOUNTER — TELEPHONE (OUTPATIENT)
Dept: FAMILY MEDICINE CLINIC | Age: 50
End: 2025-05-29

## 2025-05-29 DIAGNOSIS — Z12.11 SCREENING FOR COLON CANCER: Primary | ICD-10-CM

## 2025-05-29 NOTE — TELEPHONE ENCOUNTER
Wanda called and stated they received patients stool sample, however the order that PCP placed  in March.  Requesting a new order placed asap to be able to use current stool sample.   Order placed

## 2025-05-31 LAB — NONINV COLON CA DNA+OCC BLD SCRN STL QL: NEGATIVE

## 2025-06-02 ENCOUNTER — RESULTS FOLLOW-UP (OUTPATIENT)
Dept: FAMILY MEDICINE CLINIC | Age: 50
End: 2025-06-02

## 2025-06-02 ENCOUNTER — PATIENT MESSAGE (OUTPATIENT)
Dept: FAMILY MEDICINE CLINIC | Age: 50
End: 2025-06-02

## 2025-06-04 ENCOUNTER — HOSPITAL ENCOUNTER (OUTPATIENT)
Dept: PHYSICAL THERAPY | Age: 50
Setting detail: THERAPIES SERIES
Discharge: HOME | End: 2025-06-04
Attending: FAMILY MEDICINE
Payer: COMMERCIAL

## 2025-06-04 NOTE — FLOWSHEET NOTE
Outpatient Physical Therapy    [x] [place]  Phone: [phone]  Fax: [phone]      [] [place]  Phone: [phone]  Fax: [phone]    Physical Therapy  Cancellation/No-show Note  Patient Name:  Sinai Roberts  :  1975   Date:  2025  Cancelled visits to date: 1  No-shows to date: 0    For today's appointment patient:  [x]  Cancelled  []  Rescheduled appointment  []  No-show     Reason given by patient:  []  Patient ill  []  Conflicting appointment  []  No transportation    []  Conflict with work  []  No reason given  [x]  Other:     Comments:  \"hurt her back again at work\"     Electronically signed by: DAVE RICHARDSON PTA

## 2025-06-05 ENCOUNTER — HOSPITAL ENCOUNTER (OUTPATIENT)
Dept: MAMMOGRAPHY | Age: 50
Discharge: HOME | End: 2025-06-07
Attending: FAMILY MEDICINE
Payer: COMMERCIAL

## 2025-06-05 VITALS — BODY MASS INDEX: 28.32 KG/M2 | WEIGHT: 170 LBS | HEIGHT: 65 IN

## 2025-06-05 DIAGNOSIS — Z12.31 ENCOUNTER FOR SCREENING MAMMOGRAM FOR MALIGNANT NEOPLASM OF BREAST: ICD-10-CM

## 2025-06-05 PROCEDURE — 77063 BREAST TOMOSYNTHESIS BI: CPT

## 2025-06-06 ENCOUNTER — RESULTS FOLLOW-UP (OUTPATIENT)
Dept: PRIMARY CARE CLINIC | Age: 50
End: 2025-06-06

## 2025-06-06 ENCOUNTER — HOSPITAL ENCOUNTER (OUTPATIENT)
Dept: PHYSICAL THERAPY | Age: 50
Setting detail: THERAPIES SERIES
Discharge: HOME | End: 2025-06-06
Attending: FAMILY MEDICINE
Payer: COMMERCIAL

## 2025-06-06 PROCEDURE — 97110 THERAPEUTIC EXERCISES: CPT

## 2025-06-06 NOTE — FLOWSHEET NOTE
Physical Therapy Daily Treatment Note    Date:  2025    Patient Name:  Sinai Roberts    :  1975  MRN: [id number]  Restrictions/Precautions:     Medical/Treatment Diagnosis Information:    Chronic bilateral low back pain without sciatica [M54.50, G89.29]  Chronic pain of right knee [M25.561, G89.29]         Insurance/Certification information:   [facility]   Physician Information:   Libby Denton MD   Plan of care signed (Y/N):  y  Visit# / total visits:  2/10  Pain level: 0/10       Time In: 9:30   Time Out: 10:02    Progress Note: []  Yes  [x]  No  Next due by: Visit #10  Or by    Subjective:   Pt reports doing better than the other day. Hurts back at work frequently. Walking in this date has no back pain. Knee is also doing well.    Objective: YAZAN performed per flow sheet for increased mobility, stability, and strengthening for improvements in completion of work tasks and ambulation. Verbal cueing for sequencing and proper form.     Observation:   Test measurements:      Exercises:   Exercise/Equipment Resistance/Repetitions Other comments         Prone Prop 3'      Prone Press up 10x     Prone BKF 10x     Prone hip extension 10x           clamshell      LTR 10x5\"     bridges 10x     PPT 10x            Modified ext  10x      standing ext  10x      HR/TR, HS curls        hip abd/ext  10x              TB rows/ext  10x red     SPO 10x red         Counter Exs      Squats 10x    Lunges      Side Step with Squat      Monster walks           Push/pull sled     Crate Lifting             [] Provided verbal/tactile cueing for activities related to strengthening, flexibility, endurance, ROM. (89892)  [] Provided verbal/tactile cueing for activities related to improving balance, coordination, kinesthetic sense, posture, motor skill, proprioception. (95639)    Therapeutic Activities:     [] Therapeutic activities, direct (one-on-one) patient contact (use of dynamic activities to improve functional performance).

## 2025-06-10 ENCOUNTER — HOSPITAL ENCOUNTER (OUTPATIENT)
Dept: PHYSICAL THERAPY | Age: 50
Setting detail: THERAPIES SERIES
Discharge: HOME | End: 2025-06-10
Attending: FAMILY MEDICINE
Payer: COMMERCIAL

## 2025-06-10 PROCEDURE — 97110 THERAPEUTIC EXERCISES: CPT

## 2025-06-10 NOTE — FLOWSHEET NOTE
initiated [] Hold pending MD visit [] Discharge    Plan for Next Session:      Electronically signed by:  Carolyn Guy PTA

## 2025-06-12 ENCOUNTER — HOSPITAL ENCOUNTER (OUTPATIENT)
Dept: PHYSICAL THERAPY | Age: 50
Setting detail: THERAPIES SERIES
Discharge: HOME | End: 2025-06-12
Attending: FAMILY MEDICINE
Payer: COMMERCIAL

## 2025-06-12 PROCEDURE — 97110 THERAPEUTIC EXERCISES: CPT

## 2025-06-12 NOTE — FLOWSHEET NOTE
Physical Therapy Daily Treatment Note    Date:  2025    Patient Name:  Sinai Roberts    :  1975  MRN: [id number]  Restrictions/Precautions:     Medical/Treatment Diagnosis Information:    Chronic bilateral low back pain without sciatica [M54.50, G89.29]  Chronic pain of right knee [M25.561, G89.29]         Insurance/Certification information:   [facility]   Physician Information:   Libby Denton MD   Plan of care signed (Y/N):  y  Visit# / total visits:  4/10  Pain level: 0/10       Time In: 8:05   Time Out: 8:37    Progress Note: []  Yes  [x]  No  Next due by: Visit #10  Or by    Subjective:   Pt states has some muscle soreness after PT however is muscle pain. Has only had to take muscle relaxer once. Knee has not been an issue.    Objective: YAZAN performed per flow sheet for increased mobility, stability, and strengthening for improvements in completion of work tasks and ambulation. Verbal cueing for sequencing and proper form.     Observation:   Test measurements:  no pain pushing 50#    Exercises:   Exercise/Equipment Resistance/Repetitions Other comments         Prone Prop 3'      Prone Press up 15x     Prone BKF 15x     Prone hip extension 15x           clamshell 10x     LTR 10x5\"     bridges 10x     PPT 10x            Modified ext  15x      standing ext  10x      HR/TR, HS curls, marches  10x      hip abd/ext  15x              TB rows/ext  10x red     SPO 10x red              Squats 10x    Lunges      Side Step with Squat  2 laps red    Monster walks  2 laps red         Push/pull sled     Crate Lifting             [] Provided verbal/tactile cueing for activities related to strengthening, flexibility, endurance, ROM. (67895)  [] Provided verbal/tactile cueing for activities related to improving balance, coordination, kinesthetic sense, posture, motor skill, proprioception. (44476)    Therapeutic Activities:     [] Therapeutic activities, direct (one-on-one) patient contact (use of dynamic

## 2025-06-17 ENCOUNTER — HOSPITAL ENCOUNTER (OUTPATIENT)
Dept: PHYSICAL THERAPY | Age: 50
Setting detail: THERAPIES SERIES
Discharge: HOME | End: 2025-06-17
Attending: FAMILY MEDICINE
Payer: COMMERCIAL

## 2025-06-17 PROCEDURE — 97110 THERAPEUTIC EXERCISES: CPT

## 2025-06-17 NOTE — FLOWSHEET NOTE
Physical Therapy Daily Treatment Note    Date:  2025    Patient Name:  Sinai Roberts    :  1975  MRN: [id number]  Restrictions/Precautions:     Medical/Treatment Diagnosis Information:    Chronic bilateral low back pain without sciatica [M54.50, G89.29]  Chronic pain of right knee [M25.561, G89.29]         Insurance/Certification information:   [facility]   Physician Information:   Libby Denton MD   Plan of care signed (Y/N):  y  Visit# / total visits:  5/10  Pain level: 4/10       Time In: 8:07   Time Out: 8:40    Progress Note: []  Yes  [x]  No  Next due by: Visit #10  Or by 25    Subjective:  7 minutes late this date. Pt states thoracic back started hurting at work last night. Started doing stretch and trying to use improved body mechanics with lifting which did help.     Objective: YAZAN performed per flow sheet for increased mobility, stability, and strengthening for improvements in completion of work tasks and ambulation. Verbal cueing for sequencing and proper form.     Observation:   Test measurements:      Exercises:   Exercise/Equipment Resistance/Repetitions Other comments         Prone Prop 3'   with cold pac on this date    Prone Press up 15x     Prone BKF 15x     Prone hip extension 15x           clamshell 10x     LTR 10x5\"     bridges 15x     PPT 10x            Modified ext  15x      standing ext  10x      HR/TR, HS curls, marches  10x      hip abd/ext  15x              TB rows/ext  10x green     SPO 10x red              Squats 10x    Lunges      Side Step with Squat  2 laps red    Monster walks  2 laps red         Push/pull sled     Crate Lifting             [] Provided verbal/tactile cueing for activities related to strengthening, flexibility, endurance, ROM. (79403)  [] Provided verbal/tactile cueing for activities related to improving balance, coordination, kinesthetic sense, posture, motor skill, proprioception. (73911)    Therapeutic Activities:     [] Therapeutic activities,

## 2025-06-19 ENCOUNTER — HOSPITAL ENCOUNTER (OUTPATIENT)
Dept: PHYSICAL THERAPY | Age: 50
Setting detail: THERAPIES SERIES
Discharge: HOME | End: 2025-06-19
Attending: FAMILY MEDICINE
Payer: COMMERCIAL

## 2025-06-19 PROCEDURE — 97150 GROUP THERAPEUTIC PROCEDURES: CPT | Performed by: PHYSICAL THERAPY ASSISTANT

## 2025-06-19 PROCEDURE — 97110 THERAPEUTIC EXERCISES: CPT | Performed by: PHYSICAL THERAPY ASSISTANT

## 2025-06-19 NOTE — FLOWSHEET NOTE
Physical Therapy Daily Treatment Note    Date:  2025    Patient Name:  Sinai Roberts    :  1975  MRN: [id number]  Restrictions/Precautions:     Medical/Treatment Diagnosis Information:    Chronic bilateral low back pain without sciatica [M54.50, G89.29]  Chronic pain of right knee [M25.561, G89.29]         Insurance/Certification information:   [facility]   Physician Information:   Libby Denton MD   Plan of care signed (Y/N):  y  Visit# / total visits:  6/10  Pain level: -10       Time In: 907  Time Out: 944    Progress Note: []  Yes  [x]  No  Next due by: Visit #10  Or by 25    1-: 7910-5248  Group: [id number]    Subjective:  Soreness noted intermittently in back and knee. No pain noted at initiation of session.     Objective: YAZAN performed per flow sheet for increased mobility, stability, and strengthening for improvements in completion of work tasks and ambulation. Verbal cueing for sequencing and proper form. Advanced and progressed several exercises this date. Discussed and demonstrated in proper lifting and bending technique. No increase in pain noted, general soreness only.     Observation:   Test measurements:      Exercises:   Exercise/Equipment Resistance/Repetitions Other comments         Prone Prop 3'   with cold pac on this date    Prone Press up 10x2     Prone BKF 10x2     Prone hip extension 10x2           clamshell 10x     LTR 10x5\"     bridges 10x2 Narrow, wide   PPT 10x            Modified ext  15x      standing ext  10x      HR/TR, HS curls, marches  10x      hip abd/ext  15x              TB rows/ext  15x green     SPO 10x GR              Squats 10x    Lunges      Side Step with Squat  2 laps red    Monster walks  2 laps red         Push/pull sled     Crate Lifting             [] Provided verbal/tactile cueing for activities related to strengthening, flexibility, endurance, ROM. (08952)  [] Provided verbal/tactile cueing for activities related to improving balance,

## 2025-06-24 ENCOUNTER — HOSPITAL ENCOUNTER (OUTPATIENT)
Dept: PHYSICAL THERAPY | Age: 50
Setting detail: THERAPIES SERIES
Discharge: HOME | End: 2025-06-24
Attending: FAMILY MEDICINE
Payer: COMMERCIAL

## 2025-06-24 NOTE — PROGRESS NOTES
Physical Therapy  Outpatient Physical Therapy    [x] [place]  Phone: [phone]  Fax: [phone]      [] [place]  Phone: [phone]  Fax: [phone]    Physical Therapy  Cancellation/No-show Note  Patient Name:  Sinai Roberts  :  1975   Date:  2025  Cancelled visits to date: 1  No-shows to date: 0    For today's appointment patient:  [x]  Cancelled  []  Rescheduled appointment  []  No-show     Reason given by patient:  []  Patient ill  []  Conflicting appointment  []  No transportation    []  Conflict with work  []  No reason given  []  Other:     Comments:  knee is swollen    Electronically signed by: Carolyn Guy PTA

## 2025-06-26 ENCOUNTER — HOSPITAL ENCOUNTER (OUTPATIENT)
Dept: PHYSICAL THERAPY | Age: 50
Setting detail: THERAPIES SERIES
Discharge: HOME | End: 2025-06-26
Attending: FAMILY MEDICINE
Payer: COMMERCIAL

## 2025-06-26 PROCEDURE — 97110 THERAPEUTIC EXERCISES: CPT

## 2025-06-26 NOTE — FLOWSHEET NOTE
Physical Therapy Daily Treatment Note    Date:  2025    Patient Name:  Sinai Roberts    :  1975  MRN: [id number]  Restrictions/Precautions:     Medical/Treatment Diagnosis Information:    Chronic bilateral low back pain without sciatica [M54.50, G89.29]  Chronic pain of right knee [M25.561, G89.29]         Insurance/Certification information:   [facility]   Physician Information:   Libby Denton MD   Plan of care signed (Y/N):  y  Visit# / total visits:  7/10  Pain level: -/10       Time In: 8:10    Time Out: 844    Progress Note: []  Yes  [x]  No  Next due by: Visit #10  Or by 25    Subjective:  Knee was swollen on Tuesday after work. Swelling is down now. Does not really have pain with the swelling. Back feels good today. Has been working on body mechanics at work.    Objective: YAZAN performed per flow sheet for increased mobility, stability, and strengthening for improvements in completion of work tasks and ambulation. Verbal cueing for sequencing and proper form. Advanced and progressed several exercises this date. Discussed and demonstrated in proper lifting and bending technique. No increase in pain noted, general soreness only.     Observation:   Test measurements:      Exercises:   Exercise/Equipment Resistance/Repetitions Other comments         Prone Prop 3'      Prone Press up 10x2     Prone BKF 10x2     Prone hip extension 10x2           clamshell 10x     LTR 10x5\"     bridges 10x2 Narrow, wide   PPT 10x            Modified ext  15x      standing ext  10x      HR/TR, HS curls, marches  10x      hip abd/ext  15x              TB rows/ext  15x green     SPO 10x GR              Squats 10x    Lunges      Side Step with Squat  2 laps red    Monster walks  2 laps red         Push/pull sled     Crate Lifting             [] Provided verbal/tactile cueing for activities related to strengthening, flexibility, endurance, ROM. (52538)  [] Provided verbal/tactile cueing for activities related to

## 2025-07-01 ENCOUNTER — HOSPITAL ENCOUNTER (OUTPATIENT)
Dept: PHYSICAL THERAPY | Age: 50
Setting detail: THERAPIES SERIES
Discharge: HOME | End: 2025-07-01
Attending: FAMILY MEDICINE
Payer: COMMERCIAL

## 2025-07-01 PROCEDURE — 97110 THERAPEUTIC EXERCISES: CPT | Performed by: PHYSICAL THERAPIST

## 2025-07-01 NOTE — FLOWSHEET NOTE
Physical Therapy Daily Treatment Note    Date:  2025    Patient Name:  Sinai Roberts    :  1975  MRN: [id number]  Restrictions/Precautions:     Medical/Treatment Diagnosis Information:    Chronic bilateral low back pain without sciatica [M54.50, G89.29]  Chronic pain of right knee [M25.561, G89.29]         Insurance/Certification information:   [facility]   Physician Information:   Libby Denton MD   Plan of care signed (Y/N):  y  Visit# / total visits:  8/10  Pain level: 1-2/10       Time In: 8:13   Time Out: 840  Progress Note: []  Yes  [x]  No  Next due by: Visit #10  Or by 25    Subjective:  Patient noting 90% improvement, 5/10 at worst usually at work, occasionally at work.  Patient noting has been working on body mechanics at work.       Objective: YAZAN performed per flow sheet for increased mobility, stability, and strengthening for improvements in completion of work tasks and ambulation. Verbal cueing for sequencing and proper form. Updated HEP, recheck completed this date.  All goals met.    Observation:   Test measurements:  Oswestry 4.4%    Exercises:   Exercise/Equipment Resistance/Repetitions Other comments         Prone Prop 3'      Prone Press up 10x2     Prone BKF 10x2     Prone hip extension 10x2           clamshell 10x     LTR 10x5\"     bridges 10x2 Narrow, wide   PPT 15x            Modified ext  15x      standing ext  15x      HR/TR, HS curls, marches      hip abd/ext            TB rows/ext     SPO            Squats    Lunges     Side Step with Squat     Monster walks          Push/pull sled     Crate Lifting             [] Provided verbal/tactile cueing for activities related to strengthening, flexibility, endurance, ROM. (10900)  [] Provided verbal/tactile cueing for activities related to improving balance, coordination, kinesthetic sense, posture, motor skill, proprioception. (29608)    Therapeutic Activities:     [] Therapeutic activities, direct (one-on-one) patient contact

## 2025-07-01 NOTE — DISCHARGE SUMMARY
[facility]  Rehabilitation and Sports Medicine    [x] [place]  Phone: [phone]  Fax: [phone]      [] [place]  Phone: [phone]  Fax: [phone]    Physical Therapy Discharge Note  Date: 2025        Patient Name:  Sinai Roberts    :  1975  MRN: [id number]  Restrictions/Precautions:     Medical/Treatment Diagnosis Information:    Chronic bilateral low back pain without sciatica [M54.50, G89.29]  Chronic pain of right knee [M25.561, G89.29]        Insurance/Certification information:   [facility]   Physician Information:   Libby Denotn MD   Plan of care signed (Y/N):  y  Visit# / total visits:  8/10  Pain level:      1-2/10       Time Period for Report:  25-25  Cancels/No-shows to date:      Plan of Care/Treatment to date:  [x] Therapeutic Exercise    [x] Modalities:  [x] Therapeutic Activity     [] Ultrasound  [] Electrical Stimulation  [x] Gait Training      [] Cervical Traction    [] Lumbar Traction  [x] Neuromuscular Re-education  [] Cold/hotpack [] Iontophoresis  [x] Instruction in HEP      Other:  [x] Manual Therapy       []    [] Aquatic Therapy       []                          Subjective:   Patient noting 90% improvement, 5/10 at worst usually at work, occasionally at work.  Patient noting has been working on body mechanics at work.         Objective: YAZAN performed per flow sheet for increased mobility, stability, and strengthening for improvements in completion of work tasks and ambulation. Verbal cueing for sequencing and proper form. Updated HEP, recheck completed this date.  All goals met.    Observation:   Test measurements:  Oswestry 4.4%         Assessment:   Patient has nearly met all goals for back pain and radicular symptoms.      Plan:    DC PT        Goals:   Short Term Goals  Time Frame for Short Term Goals: 3 weeks  Short Term Goal 1: Initiate HEP (met)     Long Term Goals  Time Frame for Long Term Goals : 6 weeks  Long Term Goal 1:

## 2025-07-03 ENCOUNTER — APPOINTMENT (OUTPATIENT)
Dept: PHYSICAL THERAPY | Age: 50
End: 2025-07-03
Attending: FAMILY MEDICINE
Payer: COMMERCIAL